# Patient Record
Sex: MALE | Race: WHITE | NOT HISPANIC OR LATINO | Employment: OTHER | URBAN - METROPOLITAN AREA
[De-identification: names, ages, dates, MRNs, and addresses within clinical notes are randomized per-mention and may not be internally consistent; named-entity substitution may affect disease eponyms.]

---

## 2021-08-19 ENCOUNTER — MEDICAL CORRESPONDENCE (OUTPATIENT)
Dept: HEALTH INFORMATION MANAGEMENT | Facility: CLINIC | Age: 71
End: 2021-08-19
Payer: MEDICARE

## 2021-12-09 DIAGNOSIS — Z11.59 ENCOUNTER FOR SCREENING FOR OTHER VIRAL DISEASES: ICD-10-CM

## 2021-12-30 NOTE — PLAN OF CARE
Your Elective TKA Patient:  Patient Name:  Delmer Miguel  : 1950  MRN: [id number]  Surgeon: Dr Long  DOS/Procedure:  2022/R TKA  Hospital: [facility]  Insurance - Medicare [facility]  PCP Viktor Chadwick  Zip code [address]     Assessment Findings: Will update with additional concerns from the H&P as indicated  Medical            Age 71            DM Type 2 - insulin dependent (1pt)            HTN- managed with 3 meds  (1pt)            CKD stage 3            Stress test schedule Mich. 3 2022            Edema of Extremities (open wound of RLL-dime size) wears compression socks            BMI= 44.21 (3pts)        Functional  Currently Ambulation - No assistive devices  Met score -No sob able to walk 4 blocks  Falls - none in last year     Living Situation  Stairs to enter home -   no steps all one level  Bathroom setup - Walk-in shower, raised toilet, no grabs bar  Sleeps in Chair - he will work on elevating his legs with pillow and ottoman (doesn t like to sleep in bed)     Post Op Support/Resources  Support - Spouse -   Transportation - Spouse     Discharge Disposition  Home with outpatient therapy at [facility] - scheduled     Please direct questions or concerns to:  Pepper Andino RN  Trauma Coordinator     [facility]  [place]  [address]   [place] [address]  T: [phone]  C. [phone]  F: [phone]  E: [email or web address]

## 2021-12-31 ENCOUNTER — LAB (OUTPATIENT)
Dept: LAB | Facility: CLINIC | Age: 71
End: 2021-12-31
Attending: ORTHOPAEDIC SURGERY
Payer: MEDICARE

## 2021-12-31 DIAGNOSIS — Z11.59 ENCOUNTER FOR SCREENING FOR OTHER VIRAL DISEASES: ICD-10-CM

## 2021-12-31 PROCEDURE — U0003 INFECTIOUS AGENT DETECTION BY NUCLEIC ACID (DNA OR RNA); SEVERE ACUTE RESPIRATORY SYNDROME CORONAVIRUS 2 (SARS-COV-2) (CORONAVIRUS DISEASE [COVID-19]), AMPLIFIED PROBE TECHNIQUE, MAKING USE OF HIGH THROUGHPUT TECHNOLOGIES AS DESCRIBED BY CMS-2020-01-R: HCPCS

## 2021-12-31 PROCEDURE — U0005 INFEC AGEN DETEC AMPLI PROBE: HCPCS

## 2021-12-31 RX ORDER — LOSARTAN POTASSIUM 100 MG/1
100 TABLET ORAL DAILY
Status: ON HOLD | COMMUNITY
End: 2022-01-05

## 2021-12-31 RX ORDER — ALLOPURINOL 300 MG/1
300 TABLET ORAL DAILY
COMMUNITY

## 2021-12-31 RX ORDER — GLIPIZIDE 2.5 MG/1
2.5 TABLET, EXTENDED RELEASE ORAL
COMMUNITY

## 2021-12-31 RX ORDER — ASPIRIN 81 MG/1
81 TABLET ORAL
Status: ON HOLD | COMMUNITY
End: 2022-01-05

## 2021-12-31 RX ORDER — NYSTATIN 100000 U/G
1 OINTMENT TOPICAL DAILY PRN
COMMUNITY

## 2021-12-31 RX ORDER — LIRAGLUTIDE 6 MG/ML
1.8 INJECTION SUBCUTANEOUS DAILY
COMMUNITY

## 2021-12-31 RX ORDER — POTASSIUM CHLORIDE 750 MG/1
10 TABLET, EXTENDED RELEASE ORAL DAILY
COMMUNITY

## 2021-12-31 RX ORDER — FUROSEMIDE 40 MG
40 TABLET ORAL 2 TIMES DAILY
COMMUNITY

## 2021-12-31 RX ORDER — CHLORAL HYDRATE 500 MG
1 CAPSULE ORAL DAILY
COMMUNITY

## 2021-12-31 RX ORDER — GLIPIZIDE 5 MG/1
5 TABLET, FILM COATED, EXTENDED RELEASE ORAL
COMMUNITY

## 2021-12-31 RX ORDER — LEVOTHYROXINE SODIUM 50 UG/1
75 TABLET ORAL DAILY
COMMUNITY

## 2021-12-31 RX ORDER — LEVOTHYROXINE SODIUM 175 UG/1
175 TABLET ORAL DAILY
COMMUNITY

## 2021-12-31 RX ORDER — LABETALOL 200 MG/1
200 TABLET, FILM COATED ORAL 3 TIMES DAILY
COMMUNITY

## 2021-12-31 RX ORDER — ATORVASTATIN CALCIUM 80 MG/1
80 TABLET, FILM COATED ORAL DAILY
COMMUNITY

## 2021-12-31 RX ORDER — AMLODIPINE BESYLATE 10 MG/1
10 TABLET ORAL DAILY
COMMUNITY

## 2021-12-31 RX ORDER — BETAMETHASONE DIPROPIONATE 0.5 MG/G
CREAM TOPICAL 2 TIMES DAILY
Status: ON HOLD | COMMUNITY
End: 2022-01-04

## 2022-01-03 LAB — EJECTION FRACTION: 62 %

## 2022-01-04 ENCOUNTER — APPOINTMENT (OUTPATIENT)
Dept: GENERAL RADIOLOGY | Facility: CLINIC | Age: 72
End: 2022-01-04
Attending: PHYSICIAN ASSISTANT
Payer: MEDICARE

## 2022-01-04 ENCOUNTER — ANESTHESIA (OUTPATIENT)
Dept: SURGERY | Facility: CLINIC | Age: 72
End: 2022-01-04
Payer: MEDICARE

## 2022-01-04 ENCOUNTER — APPOINTMENT (OUTPATIENT)
Dept: PHYSICAL THERAPY | Facility: CLINIC | Age: 72
End: 2022-01-04
Attending: ORTHOPAEDIC SURGERY
Payer: MEDICARE

## 2022-01-04 ENCOUNTER — ANESTHESIA EVENT (OUTPATIENT)
Dept: SURGERY | Facility: CLINIC | Age: 72
End: 2022-01-04
Payer: MEDICARE

## 2022-01-04 ENCOUNTER — HOSPITAL ENCOUNTER (OUTPATIENT)
Facility: CLINIC | Age: 72
Discharge: HOME | End: 2022-01-05
Attending: ORTHOPAEDIC SURGERY | Admitting: ORTHOPAEDIC SURGERY
Payer: MEDICARE

## 2022-01-04 DIAGNOSIS — Z98.890 POST-OPERATIVE STATE: Primary | ICD-10-CM

## 2022-01-04 PROBLEM — Z96.659 S/P TOTAL KNEE ARTHROPLASTY: Status: ACTIVE | Noted: 2022-01-04

## 2022-01-04 LAB
CREAT SERPL-MCNC: 1.59 MG/DL (ref 0.66–1.25)
FASTING STATUS PATIENT QL REPORTED: YES
GFR SERPL CREATININE-BSD FRML MDRD: 46 ML/MIN/1.73M2
GLUCOSE BLD-MCNC: 158 MG/DL (ref 70–99)
GLUCOSE BLDC GLUCOMTR-MCNC: 125 MG/DL (ref 70–99)
GLUCOSE BLDC GLUCOMTR-MCNC: 136 MG/DL (ref 70–99)
GLUCOSE BLDC GLUCOMTR-MCNC: 146 MG/DL (ref 70–99)
HBA1C MFR BLD: 6.6 % (ref 0–5.6)
HGB BLD-MCNC: 15 G/DL (ref 13.3–17.7)
POTASSIUM BLD-SCNC: 3.7 MMOL/L (ref 3.4–5.3)

## 2022-01-04 PROCEDURE — 99203 OFFICE O/P NEW LOW 30 MIN: CPT | Performed by: PHYSICIAN ASSISTANT

## 2022-01-04 PROCEDURE — 258N000003 HC RX IP 258 OP 636: Performed by: ANESTHESIOLOGY

## 2022-01-04 PROCEDURE — 82947 ASSAY GLUCOSE BLOOD QUANT: CPT | Mod: 91 | Performed by: ANESTHESIOLOGY

## 2022-01-04 PROCEDURE — 97110 THERAPEUTIC EXERCISES: CPT | Mod: GP

## 2022-01-04 PROCEDURE — 999N000141 HC STATISTIC PRE-PROCEDURE NURSING ASSESSMENT: Performed by: ORTHOPAEDIC SURGERY

## 2022-01-04 PROCEDURE — 250N000009 HC RX 250: Performed by: ANESTHESIOLOGY

## 2022-01-04 PROCEDURE — 250N000011 HC RX IP 250 OP 636: Performed by: NURSE ANESTHETIST, CERTIFIED REGISTERED

## 2022-01-04 PROCEDURE — 272N000001 HC OR GENERAL SUPPLY STERILE: Performed by: ORTHOPAEDIC SURGERY

## 2022-01-04 PROCEDURE — 83036 HEMOGLOBIN GLYCOSYLATED A1C: CPT | Performed by: PHYSICIAN ASSISTANT

## 2022-01-04 PROCEDURE — 250N000011 HC RX IP 250 OP 636: Performed by: PHYSICIAN ASSISTANT

## 2022-01-04 PROCEDURE — 250N000011 HC RX IP 250 OP 636: Performed by: ANESTHESIOLOGY

## 2022-01-04 PROCEDURE — 97116 GAIT TRAINING THERAPY: CPT | Mod: GP

## 2022-01-04 PROCEDURE — 250N000013 HC RX MED GY IP 250 OP 250 PS 637: Performed by: PHYSICIAN ASSISTANT

## 2022-01-04 PROCEDURE — 370N000017 HC ANESTHESIA TECHNICAL FEE, PER MIN: Performed by: ORTHOPAEDIC SURGERY

## 2022-01-04 PROCEDURE — 82565 ASSAY OF CREATININE: CPT | Performed by: ANESTHESIOLOGY

## 2022-01-04 PROCEDURE — 97161 PT EVAL LOW COMPLEX 20 MIN: CPT | Mod: GP

## 2022-01-04 PROCEDURE — 710N000009 HC RECOVERY PHASE 1, LEVEL 1, PER MIN: Performed by: ORTHOPAEDIC SURGERY

## 2022-01-04 PROCEDURE — 258N000003 HC RX IP 258 OP 636: Performed by: PHYSICIAN ASSISTANT

## 2022-01-04 PROCEDURE — 278N000051 HC OR IMPLANT GENERAL: Performed by: ORTHOPAEDIC SURGERY

## 2022-01-04 PROCEDURE — 999N000063 XR KNEE PORT RIGHT 1/2 VIEWS: Mod: RT

## 2022-01-04 PROCEDURE — C1776 JOINT DEVICE (IMPLANTABLE): HCPCS | Performed by: ORTHOPAEDIC SURGERY

## 2022-01-04 PROCEDURE — 360N000077 HC SURGERY LEVEL 4, PER MIN: Performed by: ORTHOPAEDIC SURGERY

## 2022-01-04 PROCEDURE — 36415 COLL VENOUS BLD VENIPUNCTURE: CPT | Performed by: ANESTHESIOLOGY

## 2022-01-04 PROCEDURE — 250N000009 HC RX 250: Performed by: NURSE ANESTHETIST, CERTIFIED REGISTERED

## 2022-01-04 PROCEDURE — 82962 GLUCOSE BLOOD TEST: CPT

## 2022-01-04 PROCEDURE — 84132 ASSAY OF SERUM POTASSIUM: CPT | Performed by: ANESTHESIOLOGY

## 2022-01-04 PROCEDURE — 85018 HEMOGLOBIN: CPT | Performed by: ANESTHESIOLOGY

## 2022-01-04 PROCEDURE — 250N000009 HC RX 250: Performed by: ORTHOPAEDIC SURGERY

## 2022-01-04 PROCEDURE — 258N000001 HC RX 258: Performed by: ORTHOPAEDIC SURGERY

## 2022-01-04 PROCEDURE — 250N000011 HC RX IP 250 OP 636: Performed by: ORTHOPAEDIC SURGERY

## 2022-01-04 PROCEDURE — 99207 PR CDG-CODE CATEGORY CHANGED: CPT | Performed by: PHYSICIAN ASSISTANT

## 2022-01-04 DEVICE — IMP COMP PATELLA SNN GEN II RESURFACING 41MM 71926226: Type: IMPLANTABLE DEVICE | Site: KNEE | Status: FUNCTIONAL

## 2022-01-04 DEVICE — IMP COMP FEMORAL S&N LEGION PS NP SZ6 RT 71423236: Type: IMPLANTABLE DEVICE | Site: KNEE | Status: FUNCTIONAL

## 2022-01-04 DEVICE — IMP BASEPLATE TIBIAL GENESIS II SZ 6 RT TI 71420188: Type: IMPLANTABLE DEVICE | Site: KNEE | Status: FUNCTIONAL

## 2022-01-04 DEVICE — IMP INSERT TIB S&N LGN PS HI FLEX XLPE SZ5-6 11MM 71453222: Type: IMPLANTABLE DEVICE | Site: KNEE | Status: FUNCTIONAL

## 2022-01-04 DEVICE — BONE CEMENT SIMPLEX FULL DOSE 6191-1-001: Type: IMPLANTABLE DEVICE | Site: KNEE | Status: FUNCTIONAL

## 2022-01-04 RX ORDER — VANCOMYCIN HYDROCHLORIDE 1 G/20ML
INJECTION, POWDER, LYOPHILIZED, FOR SOLUTION INTRAVENOUS PRN
Status: DISCONTINUED | OUTPATIENT
Start: 2022-01-04 | End: 2022-01-04 | Stop reason: HOSPADM

## 2022-01-04 RX ORDER — BISACODYL 10 MG
10 SUPPOSITORY, RECTAL RECTAL DAILY PRN
Status: DISCONTINUED | OUTPATIENT
Start: 2022-01-04 | End: 2022-01-05 | Stop reason: HOSPADM

## 2022-01-04 RX ORDER — AMOXICILLIN 250 MG
1 CAPSULE ORAL 2 TIMES DAILY
Status: DISCONTINUED | OUTPATIENT
Start: 2022-01-04 | End: 2022-01-05 | Stop reason: HOSPADM

## 2022-01-04 RX ORDER — POLYETHYLENE GLYCOL 3350 17 G/17G
17 POWDER, FOR SOLUTION ORAL DAILY
Status: DISCONTINUED | OUTPATIENT
Start: 2022-01-05 | End: 2022-01-05 | Stop reason: HOSPADM

## 2022-01-04 RX ORDER — LOSARTAN POTASSIUM 100 MG/1
100 TABLET ORAL DAILY
Status: DISCONTINUED | OUTPATIENT
Start: 2022-01-05 | End: 2022-01-05 | Stop reason: HOSPADM

## 2022-01-04 RX ORDER — SODIUM CHLORIDE, SODIUM LACTATE, POTASSIUM CHLORIDE, CALCIUM CHLORIDE 600; 310; 30; 20 MG/100ML; MG/100ML; MG/100ML; MG/100ML
INJECTION, SOLUTION INTRAVENOUS CONTINUOUS
Status: DISCONTINUED | OUTPATIENT
Start: 2022-01-04 | End: 2022-01-04 | Stop reason: HOSPADM

## 2022-01-04 RX ORDER — ACETAMINOPHEN 325 MG/1
975 TABLET ORAL ONCE
Status: COMPLETED | OUTPATIENT
Start: 2022-01-04 | End: 2022-01-04

## 2022-01-04 RX ORDER — SODIUM CHLORIDE, SODIUM LACTATE, POTASSIUM CHLORIDE, CALCIUM CHLORIDE 600; 310; 30; 20 MG/100ML; MG/100ML; MG/100ML; MG/100ML
INJECTION, SOLUTION INTRAVENOUS CONTINUOUS
Status: DISCONTINUED | OUTPATIENT
Start: 2022-01-04 | End: 2022-01-05 | Stop reason: HOSPADM

## 2022-01-04 RX ORDER — HYDROMORPHONE HCL IN WATER/PF 6 MG/30 ML
0.2 PATIENT CONTROLLED ANALGESIA SYRINGE INTRAVENOUS EVERY 5 MIN PRN
Status: DISCONTINUED | OUTPATIENT
Start: 2022-01-04 | End: 2022-01-04 | Stop reason: HOSPADM

## 2022-01-04 RX ORDER — CELECOXIB 100 MG/1
100 CAPSULE ORAL 2 TIMES DAILY
Status: DISCONTINUED | OUTPATIENT
Start: 2022-01-04 | End: 2022-01-05 | Stop reason: HOSPADM

## 2022-01-04 RX ORDER — ASPIRIN 81 MG/1
162 TABLET ORAL DAILY
Status: DISCONTINUED | OUTPATIENT
Start: 2022-01-04 | End: 2022-01-05 | Stop reason: HOSPADM

## 2022-01-04 RX ORDER — CEFAZOLIN SODIUM IN 0.9 % NACL 3 G/100 ML
3 INTRAVENOUS SOLUTION, PIGGYBACK (ML) INTRAVENOUS EVERY 8 HOURS
Status: COMPLETED | OUTPATIENT
Start: 2022-01-04 | End: 2022-01-05

## 2022-01-04 RX ORDER — LEVOTHYROXINE SODIUM 75 UG/1
75 TABLET ORAL DAILY
Status: DISCONTINUED | OUTPATIENT
Start: 2022-01-05 | End: 2022-01-04

## 2022-01-04 RX ORDER — ACETAMINOPHEN 325 MG/1
975 TABLET ORAL EVERY 8 HOURS
Status: DISCONTINUED | OUTPATIENT
Start: 2022-01-04 | End: 2022-01-05 | Stop reason: HOSPADM

## 2022-01-04 RX ORDER — CHLOROPROCAINE HYDROCHLORIDE 20 MG/ML
INJECTION, SOLUTION EPIDURAL; INFILTRATION; INTRACAUDAL; PERINEURAL
Status: DISCONTINUED | OUTPATIENT
Start: 2022-01-04 | End: 2022-01-04

## 2022-01-04 RX ORDER — DEXTROSE MONOHYDRATE 25 G/50ML
25-50 INJECTION, SOLUTION INTRAVENOUS
Status: DISCONTINUED | OUTPATIENT
Start: 2022-01-04 | End: 2022-01-05 | Stop reason: HOSPADM

## 2022-01-04 RX ORDER — DIPHENHYDRAMINE HCL 12.5MG/5ML
12.5 LIQUID (ML) ORAL EVERY 6 HOURS PRN
Status: DISCONTINUED | OUTPATIENT
Start: 2022-01-04 | End: 2022-01-05 | Stop reason: HOSPADM

## 2022-01-04 RX ORDER — FENTANYL CITRATE 0.05 MG/ML
25 INJECTION, SOLUTION INTRAMUSCULAR; INTRAVENOUS EVERY 5 MIN PRN
Status: DISCONTINUED | OUTPATIENT
Start: 2022-01-04 | End: 2022-01-04 | Stop reason: HOSPADM

## 2022-01-04 RX ORDER — CELECOXIB 200 MG/1
400 CAPSULE ORAL ONCE
Status: COMPLETED | OUTPATIENT
Start: 2022-01-04 | End: 2022-01-04

## 2022-01-04 RX ORDER — LIDOCAINE HYDROCHLORIDE 20 MG/ML
INJECTION, SOLUTION INFILTRATION; PERINEURAL PRN
Status: DISCONTINUED | OUTPATIENT
Start: 2022-01-04 | End: 2022-01-04

## 2022-01-04 RX ORDER — LIDOCAINE 40 MG/G
CREAM TOPICAL
Status: DISCONTINUED | OUTPATIENT
Start: 2022-01-04 | End: 2022-01-04 | Stop reason: HOSPADM

## 2022-01-04 RX ORDER — ONDANSETRON 2 MG/ML
4 INJECTION INTRAMUSCULAR; INTRAVENOUS EVERY 30 MIN PRN
Status: DISCONTINUED | OUTPATIENT
Start: 2022-01-04 | End: 2022-01-04 | Stop reason: HOSPADM

## 2022-01-04 RX ORDER — ALLOPURINOL 300 MG/1
300 TABLET ORAL DAILY
Status: DISCONTINUED | OUTPATIENT
Start: 2022-01-05 | End: 2022-01-05 | Stop reason: HOSPADM

## 2022-01-04 RX ORDER — ONDANSETRON 4 MG/1
4 TABLET, ORALLY DISINTEGRATING ORAL EVERY 30 MIN PRN
Status: DISCONTINUED | OUTPATIENT
Start: 2022-01-04 | End: 2022-01-04 | Stop reason: HOSPADM

## 2022-01-04 RX ORDER — OXYCODONE HYDROCHLORIDE 5 MG/1
5 TABLET ORAL EVERY 4 HOURS PRN
Status: DISCONTINUED | OUTPATIENT
Start: 2022-01-04 | End: 2022-01-05 | Stop reason: HOSPADM

## 2022-01-04 RX ORDER — MAGNESIUM HYDROXIDE 1200 MG/15ML
LIQUID ORAL PRN
Status: DISCONTINUED | OUTPATIENT
Start: 2022-01-04 | End: 2022-01-04 | Stop reason: HOSPADM

## 2022-01-04 RX ORDER — OXYCODONE HYDROCHLORIDE 5 MG/1
5 TABLET ORAL EVERY 4 HOURS PRN
Status: DISCONTINUED | OUTPATIENT
Start: 2022-01-04 | End: 2022-01-04 | Stop reason: HOSPADM

## 2022-01-04 RX ORDER — LEVOTHYROXINE SODIUM 75 UG/1
75 TABLET ORAL
Status: DISCONTINUED | OUTPATIENT
Start: 2022-01-05 | End: 2022-01-05 | Stop reason: HOSPADM

## 2022-01-04 RX ORDER — POTASSIUM CHLORIDE 750 MG/1
10 TABLET, EXTENDED RELEASE ORAL EVERY EVENING
Status: DISCONTINUED | OUTPATIENT
Start: 2022-01-04 | End: 2022-01-04

## 2022-01-04 RX ORDER — AMLODIPINE BESYLATE 10 MG/1
10 TABLET ORAL DAILY
Status: DISCONTINUED | OUTPATIENT
Start: 2022-01-05 | End: 2022-01-05 | Stop reason: HOSPADM

## 2022-01-04 RX ORDER — HYDROMORPHONE HCL IN WATER/PF 6 MG/30 ML
0.2 PATIENT CONTROLLED ANALGESIA SYRINGE INTRAVENOUS
Status: DISCONTINUED | OUTPATIENT
Start: 2022-01-04 | End: 2022-01-05 | Stop reason: HOSPADM

## 2022-01-04 RX ORDER — FUROSEMIDE 40 MG
40 TABLET ORAL 2 TIMES DAILY
Status: DISCONTINUED | OUTPATIENT
Start: 2022-01-05 | End: 2022-01-05 | Stop reason: HOSPADM

## 2022-01-04 RX ORDER — PROPOFOL 10 MG/ML
INJECTION, EMULSION INTRAVENOUS PRN
Status: DISCONTINUED | OUTPATIENT
Start: 2022-01-04 | End: 2022-01-04

## 2022-01-04 RX ORDER — LIDOCAINE 40 MG/G
CREAM TOPICAL
Status: DISCONTINUED | OUTPATIENT
Start: 2022-01-04 | End: 2022-01-05 | Stop reason: HOSPADM

## 2022-01-04 RX ORDER — HYDROMORPHONE HCL IN WATER/PF 6 MG/30 ML
0.4 PATIENT CONTROLLED ANALGESIA SYRINGE INTRAVENOUS
Status: DISCONTINUED | OUTPATIENT
Start: 2022-01-04 | End: 2022-01-05 | Stop reason: HOSPADM

## 2022-01-04 RX ORDER — NYSTATIN 100000 U/G
1 OINTMENT TOPICAL DAILY PRN
Status: DISCONTINUED | OUTPATIENT
Start: 2022-01-04 | End: 2022-01-05 | Stop reason: HOSPADM

## 2022-01-04 RX ORDER — HYDROXYZINE HYDROCHLORIDE 10 MG/1
10 TABLET, FILM COATED ORAL EVERY 6 HOURS PRN
Status: DISCONTINUED | OUTPATIENT
Start: 2022-01-04 | End: 2022-01-05 | Stop reason: HOSPADM

## 2022-01-04 RX ORDER — NALOXONE HYDROCHLORIDE 0.4 MG/ML
0.4 INJECTION, SOLUTION INTRAMUSCULAR; INTRAVENOUS; SUBCUTANEOUS
Status: DISCONTINUED | OUTPATIENT
Start: 2022-01-04 | End: 2022-01-05 | Stop reason: HOSPADM

## 2022-01-04 RX ORDER — TRANEXAMIC ACID 650 MG/1
1950 TABLET ORAL ONCE
Status: COMPLETED | OUTPATIENT
Start: 2022-01-04 | End: 2022-01-04

## 2022-01-04 RX ORDER — NALOXONE HYDROCHLORIDE 0.4 MG/ML
0.2 INJECTION, SOLUTION INTRAMUSCULAR; INTRAVENOUS; SUBCUTANEOUS
Status: DISCONTINUED | OUTPATIENT
Start: 2022-01-04 | End: 2022-01-05 | Stop reason: HOSPADM

## 2022-01-04 RX ORDER — PROPOFOL 10 MG/ML
INJECTION, EMULSION INTRAVENOUS CONTINUOUS PRN
Status: DISCONTINUED | OUTPATIENT
Start: 2022-01-04 | End: 2022-01-04

## 2022-01-04 RX ORDER — LABETALOL 200 MG/1
200 TABLET, FILM COATED ORAL 3 TIMES DAILY
Status: DISCONTINUED | OUTPATIENT
Start: 2022-01-04 | End: 2022-01-05 | Stop reason: HOSPADM

## 2022-01-04 RX ORDER — ONDANSETRON 4 MG/1
4 TABLET, ORALLY DISINTEGRATING ORAL EVERY 6 HOURS PRN
Status: DISCONTINUED | OUTPATIENT
Start: 2022-01-04 | End: 2022-01-05 | Stop reason: HOSPADM

## 2022-01-04 RX ORDER — HYDRALAZINE HYDROCHLORIDE 20 MG/ML
10 INJECTION INTRAMUSCULAR; INTRAVENOUS EVERY 4 HOURS PRN
Status: DISCONTINUED | OUTPATIENT
Start: 2022-01-04 | End: 2022-01-05 | Stop reason: HOSPADM

## 2022-01-04 RX ORDER — ONDANSETRON 2 MG/ML
4 INJECTION INTRAMUSCULAR; INTRAVENOUS EVERY 6 HOURS PRN
Status: DISCONTINUED | OUTPATIENT
Start: 2022-01-04 | End: 2022-01-05 | Stop reason: HOSPADM

## 2022-01-04 RX ORDER — ONDANSETRON 2 MG/ML
INJECTION INTRAMUSCULAR; INTRAVENOUS PRN
Status: DISCONTINUED | OUTPATIENT
Start: 2022-01-04 | End: 2022-01-04

## 2022-01-04 RX ORDER — CELECOXIB 100 MG/1
100 CAPSULE ORAL ONCE
Status: COMPLETED | OUTPATIENT
Start: 2022-01-04 | End: 2022-01-04

## 2022-01-04 RX ORDER — NICOTINE POLACRILEX 4 MG
15-30 LOZENGE BUCCAL
Status: DISCONTINUED | OUTPATIENT
Start: 2022-01-04 | End: 2022-01-05 | Stop reason: HOSPADM

## 2022-01-04 RX ORDER — CEFAZOLIN SODIUM IN 0.9 % NACL 3 G/100 ML
3 INTRAVENOUS SOLUTION, PIGGYBACK (ML) INTRAVENOUS SEE ADMIN INSTRUCTIONS
Status: DISCONTINUED | OUTPATIENT
Start: 2022-01-04 | End: 2022-01-04 | Stop reason: HOSPADM

## 2022-01-04 RX ORDER — ATORVASTATIN CALCIUM 40 MG/1
80 TABLET, FILM COATED ORAL DAILY
Status: DISCONTINUED | OUTPATIENT
Start: 2022-01-05 | End: 2022-01-05 | Stop reason: HOSPADM

## 2022-01-04 RX ORDER — OXYCODONE HYDROCHLORIDE 5 MG/1
10 TABLET ORAL EVERY 4 HOURS PRN
Status: DISCONTINUED | OUTPATIENT
Start: 2022-01-04 | End: 2022-01-05 | Stop reason: HOSPADM

## 2022-01-04 RX ORDER — PROCHLORPERAZINE MALEATE 5 MG
5 TABLET ORAL EVERY 6 HOURS PRN
Status: DISCONTINUED | OUTPATIENT
Start: 2022-01-04 | End: 2022-01-05 | Stop reason: HOSPADM

## 2022-01-04 RX ORDER — CEFAZOLIN SODIUM IN 0.9 % NACL 3 G/100 ML
3 INTRAVENOUS SOLUTION, PIGGYBACK (ML) INTRAVENOUS
Status: DISCONTINUED | OUTPATIENT
Start: 2022-01-04 | End: 2022-01-04 | Stop reason: HOSPADM

## 2022-01-04 RX ORDER — POTASSIUM CHLORIDE 750 MG/1
10 TABLET, EXTENDED RELEASE ORAL EVERY EVENING
Status: DISCONTINUED | OUTPATIENT
Start: 2022-01-05 | End: 2022-01-05 | Stop reason: HOSPADM

## 2022-01-04 RX ADMIN — SODIUM CHLORIDE, POTASSIUM CHLORIDE, SODIUM LACTATE AND CALCIUM CHLORIDE: 600; 310; 30; 20 INJECTION, SOLUTION INTRAVENOUS at 10:16

## 2022-01-04 RX ADMIN — HYDROXYZINE HYDROCHLORIDE 10 MG: 10 TABLET ORAL at 16:21

## 2022-01-04 RX ADMIN — LIDOCAINE HYDROCHLORIDE 1 ML: 10 INJECTION, SOLUTION EPIDURAL; INFILTRATION; INTRACAUDAL; PERINEURAL at 10:16

## 2022-01-04 RX ADMIN — HYDROMORPHONE HYDROCHLORIDE 0.2 MG: 0.2 INJECTION, SOLUTION INTRAMUSCULAR; INTRAVENOUS; SUBCUTANEOUS at 14:03

## 2022-01-04 RX ADMIN — Medication 3 G: at 21:00

## 2022-01-04 RX ADMIN — OXYCODONE HYDROCHLORIDE 5 MG: 5 TABLET ORAL at 16:20

## 2022-01-04 RX ADMIN — LABETALOL HCL 200 MG: 200 TABLET, FILM COATED ORAL at 17:32

## 2022-01-04 RX ADMIN — TRANEXAMIC ACID 1950 MG: 650 TABLET ORAL at 09:12

## 2022-01-04 RX ADMIN — CELECOXIB 400 MG: 200 CAPSULE ORAL at 09:14

## 2022-01-04 RX ADMIN — CHLOROPROCAINE HYDROCHLORIDE 60 MG: 20 INJECTION, SOLUTION EPIDURAL; INFILTRATION; INTRACAUDAL; PERINEURAL at 11:15

## 2022-01-04 RX ADMIN — CELECOXIB 100 MG: 100 CAPSULE ORAL at 20:59

## 2022-01-04 RX ADMIN — MIDAZOLAM 2 MG: 1 INJECTION INTRAMUSCULAR; INTRAVENOUS at 11:16

## 2022-01-04 RX ADMIN — ONDANSETRON 4 MG: 2 INJECTION INTRAMUSCULAR; INTRAVENOUS at 11:38

## 2022-01-04 RX ADMIN — ASPIRIN 162 MG: 81 TABLET, COATED ORAL at 16:21

## 2022-01-04 RX ADMIN — SENNOSIDES AND DOCUSATE SODIUM 1 TABLET: 50; 8.6 TABLET ORAL at 20:59

## 2022-01-04 RX ADMIN — BUPIVACAINE HYDROCHLORIDE 15 ML: 5 INJECTION, SOLUTION EPIDURAL; INTRACAUDAL at 11:10

## 2022-01-04 RX ADMIN — PROPOFOL 150 MCG/KG/MIN: 10 INJECTION, EMULSION INTRAVENOUS at 11:31

## 2022-01-04 RX ADMIN — ACETAMINOPHEN 975 MG: 325 TABLET, FILM COATED ORAL at 09:13

## 2022-01-04 RX ADMIN — LABETALOL HCL 200 MG: 200 TABLET, FILM COATED ORAL at 21:35

## 2022-01-04 RX ADMIN — PROPOFOL 30 MG: 10 INJECTION, EMULSION INTRAVENOUS at 11:33

## 2022-01-04 RX ADMIN — Medication 2 G: at 11:26

## 2022-01-04 RX ADMIN — ACETAMINOPHEN 975 MG: 325 TABLET, FILM COATED ORAL at 17:30

## 2022-01-04 RX ADMIN — SODIUM CHLORIDE, POTASSIUM CHLORIDE, SODIUM LACTATE AND CALCIUM CHLORIDE: 600; 310; 30; 20 INJECTION, SOLUTION INTRAVENOUS at 12:20

## 2022-01-04 RX ADMIN — SODIUM CHLORIDE, POTASSIUM CHLORIDE, SODIUM LACTATE AND CALCIUM CHLORIDE: 600; 310; 30; 20 INJECTION, SOLUTION INTRAVENOUS at 15:00

## 2022-01-04 RX ADMIN — LIDOCAINE HYDROCHLORIDE 60 MG: 20 INJECTION, SOLUTION INFILTRATION; PERINEURAL at 11:32

## 2022-01-04 ASSESSMENT — ENCOUNTER SYMPTOMS
SEIZURES: 0
DYSRHYTHMIAS: 0

## 2022-01-04 ASSESSMENT — LIFESTYLE VARIABLES: TOBACCO_USE: 0

## 2022-01-04 ASSESSMENT — MIFFLIN-ST. JEOR: SCORE: 2229.1

## 2022-01-04 NOTE — ANESTHESIA PROCEDURE NOTES
Intrathecal Procedure Note    Pre-Procedure   Staff -        Anesthesiologist:  Lenka Glass MD       Performed By: anesthesiologist       Location: OR       Pre-Anesthestic Checklist: patient identified, IV checked, site marked, risks and benefits discussed, informed consent, monitors and equipment checked, pre-op evaluation and at physician/surgeon's request  Timeout:       Correct Patient: Yes        Correct Procedure: Yes        Correct Site: Yes        Correct Position: Yes   Procedure Documentation  Procedure: intrathecal       Patient Position: sitting       Skin prep: Betadine       Insertion Site: L2-3. (midline approach).       Spinal Needle Type: Cristina tip       Introducer used       Introducer: 20 G      # of attempts: 1 and  # of redirects:     Assessment/Narrative         Paresthesias: No.       CSF fluid: clear.      Opening pressure was cmH2O while  Sitting.      Medication(s) Administered   2% Chloroprocaine PF (Intrathecal), 60 mg  Medication Administration Time: 1/4/2022 11:15 AM     Comments:  Patient sitting on edge of OR bed, lower back cleaned and prepped in sterile fashion with betadine. 1% lido used to numb area. Introducer placed, spinal needle through introducer. Appropriate flow of CSF and confirmed with aspiration via syringe. Spinal dose given, 60 mg 2% chloroprocaine. No complications.

## 2022-01-04 NOTE — CONSULTS
[facility]  Consult Note - Hospitalist Service     Date of Admission:  1/4/2022  Consult Requested by: Orthopedics  Reason for Consult: Medical comanagement    Assessment & Plan   Delmer Miguel is a 71 year old male with PMH significant for non-insulin-dependent diabetes, hypertension, lipidemia edema, CKD stage III, hypothyroidism and obesity, ascending aorta dilation who was admitted to [facility] on 1/4/2022 by the orthopedic service for right knee arthroplasty with Dr. Long.    Right knee arthroplasty 1/4/22  - Routine postoperative cares per primary service, including IVF, pain control, abx, DVT ppx, and disposition  - PT/OT as per primary service  - Aggressive pulmonary toilet, frequent IS use 10x/hour while awake    Chronic kidney disease, stage 3b  Baseline Cr 1.6. On admission, at baseline.  Follows with nephrology as an outpatient.  - Given 400mg celebrex this AM, to start 100mg BID, will allow 1 more dose tonight and check BMP in AM, hold remaining doses until follow up on BMP. Avoid all other NSAIDs and discontinue celebrex if Cr elevates above baseline  - Avoid nephrotoxins - contrast, NSAIDs  - Renally dose medications as able/needed  - Holding ARB until checking renal function on postop day #1  - Monitor    Benign essential hypertension  Chronic lower extremity edema  History of first-degree AV block, intraventricular block  PTA regimen: Amlodipine 10 mg daily, furosemide 40 mg twice daily, labetalol 200 mg 3 times daily, losartan 100 mg daily  - Continue PTA antihypertensives with hold parameters  - Resume PTA potassium with furosemide resume on postop day #1  - Discontinue IV fluids when taking p.o.   - Hold PTA losartan until basic metabolic panel results in a.m. to check on renal function*  - PRN IV hydralazine available for SBP >180  - Monitor BP trend and need for medication adjustments    Non-Insulin dependent type 2 diabetes  PTA Regimen:  Glipizide and Victoza  Last HbA1c 6.9%  - Hypoglycemia protocol  - Preprandial and HS fingerstick checks or Q 4 hours while NPO  - Hold PTA oral antiglycemics, resume on discharge  - Sliding scale insulin Medium   - Long acting regimen: None  - Consistent CHO diet 75gm per meal    Recent Labs   Lab 01/04/22  1357 01/04/22  0910   * 158*       SARAH: Noncompliant with CPAP.  May use nasal cannula as needed    Other pertinent history and chronic stable diagnoses: Appropriate PTA medications will be resumed.  Morbid obesity  Hypothyroidism  Gout  Ascending aorta dilatation  Normocytic anemia      Diet: High Consistent Carb (75 g CHO per Meal) Diet    DVT Prophylaxis: Defer to primary service  Malone Catheter: Not present  Code Status: Full Code      The patient's care was discussed with the Attending Physician, Dr. Yin and Patient.    SANJUANITA Anguiano, PA-C  Hospitalist MAKENZIE  [facility]  Securely message with the Vocera Web Console (learn more here)  Text page via [employer] Paging/Directory  ______________________________________________________________________    Chief Complaint   Knee pain    History is obtained from the patient and electronic health record    History of Present Illness   Delmer Miguel is a 71 year old male with PMH significant for non-insulin-dependent diabetes, hypertension, lipidemia edema, CKD stage III, hypothyroidism and obesity, ascending aorta dilation who was admitted to [facility] on 1/4/2022 by the orthopedic service for right knee arthroplasty with Dr. Long. No immediate postoperative complications, spinal block with abductor canal block., EBL 25mL. The Hospitalist service was consulted for medical co-management. Preoperative H&P reviewed.    During my visit, patient is feeling well, pain controlled.  Denies chest pain, lightheadedness, dizziness, palpitations.     Review of Systems   The 10 point Review of Systems is  negative other than noted in the HPI or here.     Past Medical History    I have reviewed this patient's medical history and updated it with pertinent information if needed.   Non-insulin-dependent type 2 diabetes  Hyperlipidemia  Morbid obesity  Hypothyroidism  Hypertension  Erectile dysfunction  Gout    Past Surgical History   I have reviewed this patient's surgical history and updated it with pertinent information if needed.  Ear surgery  Achilles tendon repair    Social History   I have reviewed this patient's social history and updated it with pertinent information if needed.  Social History     Tobacco Use     Smoking status: Former Smoker     Packs/day: 1.00     Years: 2.00     Pack years: 2.00     Types: Cigarettes, Cigars     Smokeless tobacco: Never Used     Tobacco comment: hasn't smoked anything in 25 years   Substance Use Topics     Alcohol use: None     Comment: 1-2 beer every four months     Drug use: None       Family History   I have reviewed this patient's family history and updated it with pertinent information if needed.   Father-heart disease and myocardial infarction  Mother-emphysema    Medications   I have reviewed this patient's current medications  Medications Prior to Admission   Medication Sig Dispense Refill Last Dose     allopurinol (ZYLOPRIM) 300 MG tablet Take 300 mg by mouth daily   1/4/2022 at am     amLODIPine (NORVASC) 10 MG tablet Take 10 mg by mouth daily   1/4/2022 at am     aspirin 81 MG EC tablet Take 81 mg by mouth daily (with dinner)   12/31/2021 at pm     atorvastatin (LIPITOR) 80 MG tablet Take 80 mg by mouth daily   1/4/2022 at am     fish oil-omega-3 fatty acids 1000 MG capsule Take 1 g by mouth daily   1/3/2022 at am     furosemide (LASIX) 40 MG tablet Take 40 mg by mouth 2 times daily   1/3/2022 at pm     glipiZIDE (GLUCOTROL XL) 2.5 MG 24 hr tablet Take 2.5 mg by mouth daily (with breakfast) Along with 5 mg dose (2.5 mg + 5 mg = 7.5 mg)   1/3/2022 at am     glipiZIDE  (GLUCOTROL XL) 5 MG 24 hr tablet Take 5 mg by mouth daily (with breakfast) Along with the 2.5 mg dose (5 mg + 2.5 = 7.5 mg)   1/3/2022 at am     labetalol (NORMODYNE) 200 MG tablet Take 200 mg by mouth 3 times daily   1/4/2022 at 0700     levothyroxine (SYNTHROID/LEVOTHROID) 175 MCG tablet Take 175 mcg by mouth daily Along with 50 mcg dose (175 mcg + 75 mcg = 250 mcg)   1/4/2022 at am     levothyroxine (SYNTHROID/LEVOTHROID) 50 MCG tablet Take 75 mcg by mouth daily (1.5 x 50 mg) Along with 175 mcg dose (75 mcg + 175 mcg = 250 mcg)   1/4/2022 at am     liraglutide (VICTOZA) 18 MG/3ML solution Inject 1.8 mg Subcutaneous daily   1/3/2022 at am     losartan (COZAAR) 100 MG tablet Take 100 mg by mouth daily   1/4/2022 at am     nystatin (MYCOSTATIN) 982836 UNIT/GM external ointment Apply 1 g topically daily as needed     at prn     potassium chloride ER (KLOR-CON M) 10 MEQ CR tablet Take 10 mEq by mouth daily   1/3/2022 at pm       Allergies   No Known Allergies    Physical Exam   Vital Signs: Temp: 97.2  F (36.2  C) Temp src: Oral BP: (!) 156/78 Pulse: 60   Resp: 14 SpO2: 91 % O2 Device: None (Room air) Oxygen Delivery: 1 LPM  Weight: 315 lbs 8 oz    Physical Exam    General: Awake, alert, very pleasant man who appears stated age. Looks comfortable sitting up in bed. No acute distress.  HEENT: Normocephalic, atraumatic. Extraocular movements intact.  Respiratory: Clear to auscultation bilaterally, no rales, wheezing, or rhonchi.  Cardiovascular: Regular rate and rhythm, +S1 and S2, no murmur auscultated. 1+ peripheral edema.   Gastrointestinal: Soft, non-tender, non-distended. Bowel sounds present.  Skin: Warm, dry. No obvious rashes or lesions on exposed skin. Dorsalis pedis pulses palpable bilaterally.   Musculoskeletal: No joint swelling, erythema or tenderness. Moves all extremities equally. RLE ACE wrapped. Motor and sensation intact.  Neurologic: AAO x3. Cranial nerves 2-12 grossly intact, normal strength and  sensation.  Psychiatric: Appropriate mood and affect. No obvious anxiety or depression.    Data   Results for orders placed or performed during the hospital encounter of 01/04/22 (from the past 24 hour(s))   Hemoglobin   Result Value Ref Range    Hemoglobin 15.0 13.3 - 17.7 g/dL   Creatinine   Result Value Ref Range    Creatinine 1.59 (H) 0.66 - 1.25 mg/dL    GFR Estimate 46 (L) >60 mL/min/1.73m2   Potassium   Result Value Ref Range    Potassium 3.7 3.4 - 5.3 mmol/L   Glucose   Result Value Ref Range    Glucose 158 (H) 70 - 99 mg/dL    Patient Fasting > 8hrs? Yes    Hemoglobin A1c   Result Value Ref Range    Hemoglobin A1C 6.6 (H) 0.0 - 5.6 %   Glucose by meter   Result Value Ref Range    GLUCOSE BY METER POCT 136 (H) 70 - 99 mg/dL   XR Knee Port Right 1/2 Views    Narrative    KNEE ONE-TWO VIEWS RIGHT  1/4/2022 2:09 PM     HISTORY: Post-Op Total Knee    COMPARISON: None.      Impression    IMPRESSION: Status post recent right total knee arthroplasty. No  immediate hardware complication. Expected postsurgical soft tissue  edema, subcutaneous emphysema, and gas-containing joint effusion. No  acute fracture or malalignment. Atherosclerosis.    GRACIELA MURILLO MD         SYSTEM ID:  [id number]

## 2022-01-04 NOTE — ANESTHESIA POSTPROCEDURE EVALUATION
Patient: Delmer Miguel    Procedure: Procedure(s):  RIGHT TOTAL KNEE ARTHROPLASTY       Diagnosis:Osteoarthritis of right knee, unspecified osteoarthritis type [M17.11]  Diagnosis Additional Information: No value filed.    Anesthesia Type:  Spinal    Note:  Disposition: Admission   Postop Pain Control: Uneventful            Sign Out: Well controlled pain   PONV: No   Neuro/Psych: Uneventful            Sign Out: Acceptable/Baseline neuro status   Airway/Respiratory: Uneventful            Sign Out: Acceptable/Baseline resp. status   CV/Hemodynamics: Uneventful            Sign Out: Acceptable CV status; No obvious hypovolemia; No obvious fluid overload   Other NRE: NONE   DID A NON-ROUTINE EVENT OCCUR? No           Last vitals:  Vitals Value Taken Time   /64 01/04/22 1410   Temp 36.4  C (97.6  F) 01/04/22 1410   Pulse 59 01/04/22 1416   Resp 21 01/04/22 1416   SpO2 97 % 01/04/22 1418   Vitals shown include unvalidated device data.    Electronically Signed By: Lenka Glass MD  January 4, 2022  3:32 PM

## 2022-01-04 NOTE — ANESTHESIA PROCEDURE NOTES
Adductor canal (targeting saphenous nerve) Procedure Note    Pre-Procedure   Staff -        Anesthesiologist:  Lenka Glass MD       Performed By: anesthesiologist       Location: pre-op       Pre-Anesthestic Checklist: patient identified, IV checked, site marked, risks and benefits discussed, informed consent, monitors and equipment checked, pre-op evaluation, at physician/surgeon's request and post-op pain management  Timeout:       Correct Patient: Yes        Correct Procedure: Yes        Correct Site: Yes        Correct Position: Yes        Correct Laterality: Yes        Site Marked: Yes  Procedure Documentation  Procedure: Adductor canal (targeting saphenous nerve)       Laterality: right       Patient Position: supine       Skin prep: Chloraprep       Local skin infiltrated with 1 mL of 1% lidocaine.        Needle Type: insulated       Needle Gauge: 21.        Needle Length (millimeters): 100        Ultrasound guided       1. Ultrasound was used to identify targeted nerve, plexus, vascular marker, or fascial plane and place a needle adjacent to it in real-time.       2. Ultrasound was used to visualize the spread of anesthetic in close proximity to the above referenced structure.       3. A permanent image is entered into the patient's record.       4. The visualized anatomic structures appeared normal.       5. There were no apparent abnormal pathologic findings.    Assessment/Narrative         The placement was negative for: blood aspirated, painful injection and site bleeding       Paresthesias: No.     Bolus given via needle..        Secured via.        Insertion/Infusion Method: Single Shot       Complications: none    Medication(s) Administered   Bupivacaine 0.5% w/ 1:400K Epi (Injection), 15 mL  Medication Administration Time: 1/4/2022 11:10 AM     Comments:  Bolus via needle, 15 ml of 0.5% bupivacaine with 1:400,000 epinephrine.  Patient tolerated well, was mildly sedated but communicative  throughout the procedure.   The surgeon has given a verbal order transferring care of this patient to me for the performance of regional analgesia block for post op pain control. It is requested of me because I am uniquely trained and qualified to perform this block and the surgeon is neither trained nor qualified to perform this procedure.

## 2022-01-04 NOTE — PROCEDURES
DATE OF SERVICE: 1/4/2022    SURGEON   JAKE HULL MD     FIRST ASSISTANT   ESTHER PRIEST PA-C   Please bill for Mr. Priest as first assistant as there was no resident available to assist in this case. Assistants were necessary and performed positioning, draping, retraction, suturing and wound dressing tasks throughout the surgical procedure. The assistant was present for the entire procedure.    ASSISTANTS  KRYSTIN Martinez     PREOPERATIVE DIAGNOSIS   Endstage right knee tricompartmental osteoarthritis.     POSTOPERATIVE DIAGNOSIS   Endstage right knee tricompartmental osteoarthritis.     PROCEDURE   Right  total knee arthroplasty using [employer] components, a size 6 Legion posterior stabilized femur, a size 6 tibia, a size 41 mm round patella, and a size 11 posterior stabilized polyethylene insert.     INDICATIONS FOR SURGERY   Delmer Miguel [id number] is a 71 year old-year-old male with a long history of right knee pain. The patient had failed all of the conservative and reasonable options. After discussing with the patient, it was decided that they would benefit from the above-mentioned procedure. Informed consent was obtained.     ANESTHESIA   Spinal block with an abductor canal block.     FINDINGS   There was tricompartmental osteoarthritis. Ligaments were intact otherwise.     DESCRIPTION OF PROCEDURE   Delmer Miguel was correctly identified by myself in the PAR and their right lower extremity was marked. A single-shot adductor canal block was placed. The patient was then taken to the operating room where a spinal anesthetic was placed. The patient was placed supine. A tourniquet was placed around the right proximal thigh. A bump was placed underneath the right hip. The patient was then prepped with Avagard, followed by a 10 minute Betadine scrub, alcohol paint, DuraPrep and then draped in the usual fashion. A timeout was performed. The tourniquet was then inflated to 300 mmHg. The incision  was made just medial to the patella for the mini sub-vastus approach after injection with the anesthetic solution, and carried down with sharp dissection. The vastus medialis was then released from its bed and swept laterally. The fat pad excision was performed at this time, as well as a medial release. The knee was then brought into flexion. The anterior horns of the medial and lateral menisci were excised, as well as the ACL and PCL. A PCL retractor was placed. The external tibial cutting guide was placed at 0 degrees slope. This was pinned with a planned 2 mm cut off the medial side and the cut was made with an oscillating saw. This was sized to a(n) 6, pinned in place, and we had excellent alignment with an alignment casper. The tibia was reamed and punched in the usual fashion and the trial tibial component was removed. Attention was next turned to the femur. An intramedullary hole was drilled and we placed the distal cutting guide at 5 degrees of valgus. The block was pinned into place and the distal cut was made in the usual fashion. We then placed the AP sizing guide and it was felt that a size 6 would give us the best fit. This was placed in 3 degrees of external rotation which matched up nicely with Alleghany's line as well as the epicondylar access. The 4-in-1 cutting block was pinned into place. The anterior, posterior and chamfer cuts were then made in the usual fashion. The trial femoral component was placed and the box cut was made with the reamer and box chisel in the usual fashion. At this point, we trialed the knee. We placed a 11 mm spacer. We had excellent balance throughout. The tibial trial component was removed after it was marked in proper alignment. The patella was then clamped with 2 towel clips. An oscillating saw was used to make a flat cut. The patella was sized to a round 41 mm patella, the lug holes were drilled and then undermined in the usual fashion with a curette. The trial patella  was placed. It fit nicely and sat down completely. All trial components were removed at this point. All bony surfaces were drilled with a small 3 mm drill bit and then thoroughly irrigated and dried. The rest of the local anesthetic was injected into the posterior capsule. Simplex cement mixed with tobramycin was then placed on the tibia and tibial component. The tibial component was impacted in place and the excess cement was removed. Next, cement was placed on the femoral component and on the femur and the femoral component was impacted into place. The excess cement was removed. A size 11 spacer was placed and the knee fully extended. The patella was then irrigated, dried and cement was placed onto the patella and patellar component. The patellar component was clamped in place and the excess cement removed. The knee was then lavaged with a dilute Betadine solution for 3 minutes and then irrigated again with normal saline to remove all the Betadine. The permanent 11 mm spacer was inserted with the  tool. The patella was relocated, a gram of vancomycin powder was placed in the joint, the tourniquet was deflated, and then the retinaculum was closed with a running #2 Quill suture. The subdermal layer was closed with a 0 Stratafix running suture, subdermal layer closed with a 2-0 Stratafix running suture and the skin closed with 3-0 Quill. Steri-Strips, as well as sterile dressings, an ACE bandage and ice pack were placed. There were no complications. Sponge counts correct. Tourniquet time 53 minutes. The patient was taken to PAR in stable condition.     DRAINS  None    SPECIMENS  None    COMPLICATIONS  None    ESTIMATED BLOOD LOSS  25 mL    CONDITION ON DISCHARGE FROM OR  Satisfactory    PLAN  1. Antibiotic Prophylaxis was given included Ancef 2 gm. Antibiotics given within 1 hour of surgical incision and discontinued within 24 hrs.   2. DVT prophylaxis ASA given within 24 hours    3. Weight Bearing WBAT  (Weight bearing as tolerated).   4. Discharge anticipated date POD #1 to Home vs Rehabilitation Facility   5. Pain Medication oxycodone, Tylenol and Celebrex  6. Change dressing on POD #1. Discharge with AG dressing.    JAKE HULL MD      @C(1)@ [facility] - [phone]

## 2022-01-04 NOTE — PROGRESS NOTES
Pt arrived from PACU rating knee pain #3.  Knee drsg dry and intact.  Pt has baseline danielito LE edema and venous stasis.  Denies nausea or urge to void.  Pt is morbidly obese and felt comfortable in bed so only ordering ginna equipment of commode, walker, and W/C.

## 2022-01-04 NOTE — ANESTHESIA PREPROCEDURE EVALUATION
Anesthesia Pre-Procedure Evaluation    Patient: Delmer Miguel   MRN: [id number] : 1950        Preoperative Diagnosis: Osteoarthritis of right knee, unspecified osteoarthritis type [M17.11]    Procedure : Procedure(s):  RIGHT TOTAL KNEE ARTHROPLASTY          History reviewed. No pertinent past medical history.   History reviewed. No pertinent surgical history.   No Known Allergies   Social History     Tobacco Use     Smoking status: Former Smoker     Packs/day: 1.00     Years: 2.00     Pack years: 2.00     Types: Cigarettes, Cigars     Smokeless tobacco: Never Used     Tobacco comment: hasn't smoked anything in 25 years   Substance Use Topics     Alcohol use: Not on file     Comment: 1-2 beer every four months      Wt Readings from Last 1 Encounters:   22 143.1 kg (315 lb 8 oz)        No Known Allergies  Prior to Admission medications    Medication Sig Start Date End Date Taking? Authorizing Provider   allopurinol (ZYLOPRIM) 300 MG tablet Take 300 mg by mouth daily   Yes Reported, Patient   amLODIPine (NORVASC) 10 MG tablet Take 10 mg by mouth daily   Yes Reported, Patient   aspirin 81 MG EC tablet Take 81 mg by mouth daily (with dinner)   Yes Reported, Patient   atorvastatin (LIPITOR) 80 MG tablet Take 80 mg by mouth daily   Yes Reported, Patient   fish oil-omega-3 fatty acids 1000 MG capsule Take 1 g by mouth daily   Yes Reported, Patient   furosemide (LASIX) 40 MG tablet Take 40 mg by mouth 2 times daily   Yes Reported, Patient   glipiZIDE (GLUCOTROL XL) 2.5 MG 24 hr tablet Take 2.5 mg by mouth daily (with breakfast) Along with 5 mg dose (2.5 mg + 5 mg = 7.5 mg)   Yes Reported, Patient   glipiZIDE (GLUCOTROL XL) 5 MG 24 hr tablet Take 5 mg by mouth daily (with breakfast) Along with the 2.5 mg dose (5 mg + 2.5 = 7.5 mg)   Yes Reported, Patient   labetalol (NORMODYNE) 200 MG tablet Take 200 mg by mouth 3 times daily   Yes Reported, Patient   levothyroxine (SYNTHROID/LEVOTHROID) 175 MCG tablet Take  175 mcg by mouth daily Along with 50 mcg dose (175 mcg + 75 mcg = 250 mcg)   Yes Reported, Patient   levothyroxine (SYNTHROID/LEVOTHROID) 50 MCG tablet Take 75 mcg by mouth daily (1.5 x 50 mg) Along with 175 mcg dose (75 mcg + 175 mcg = 250 mcg)   Yes Reported, Patient   liraglutide (VICTOZA) 18 MG/3ML solution Inject 1.8 mg Subcutaneous daily   Yes Reported, Patient   losartan (COZAAR) 100 MG tablet Take 100 mg by mouth daily   Yes Reported, Patient   nystatin (MYCOSTATIN) 240903 UNIT/GM external ointment Apply 1 g topically daily as needed    Yes Reported, Patient   potassium chloride ER (KLOR-CON M) 10 MEQ CR tablet Take 10 mEq by mouth daily   Yes Reported, Patient     RECENT LABS:   ECG:   ECHO:   CXR:      Anesthesia Evaluation   Pt has had prior anesthetic. Type: General.    No history of anesthetic complications       ROS/MED HX  ENT/Pulmonary:    (-) tobacco use, asthma and sleep apnea   Neurologic:    (-) no seizures, no CVA and migraines   Cardiovascular:     (+) Dyslipidemia hypertension----- (-) CAD, NINA, arrhythmias and valvular problems/murmurs   METS/Exercise Tolerance: >4 METS    Hematologic:    (-) history of blood clots and anemia   Musculoskeletal: Comment: Gout    (+) arthritis,     GI/Hepatic:    (-) GERD and liver disease   Renal/Genitourinary:    (-) renal disease and nephrolithiasis   Endo:     (+) type II DM, thyroid problem, hypothyroidism, Obesity (morbid obesity),  (-) Type I DM   Psychiatric/Substance Use:    (-) psychiatric history   Infectious Disease:    (-) Recent Fever   Malignancy:       Other:            Physical Exam    Airway        Mallampati: II   TM distance: > 3 FB   Neck ROM: full   Mouth opening: > 3 cm    Respiratory Devices and Support         Dental  no notable dental history         Cardiovascular   cardiovascular exam normal       Rhythm and rate: normal     Pulmonary   pulmonary exam normal        breath sounds clear to auscultation           OUTSIDE LABS:  CBC:    Lab Results   Component Value Date    HGB 15.0 01/04/2022     BMP:   Lab Results   Component Value Date    POTASSIUM 3.7 01/04/2022    CR 1.59 (H) 01/04/2022     (H) 01/04/2022     COAGS: No results found for: PTT, INR, FIBR  POC: No results found for: BGM, HCG, HCGS  HEPATIC: No results found for: ALBUMIN, PROTTOTAL, ALT, AST, GGT, ALKPHOS, BILITOTAL, BILIDIRECT, SOPHY  OTHER: No results found for: PH, LACT, A1C, ALFA, PHOS, MAG, LIPASE, AMYLASE, TSH, T4, T3, CRP, SED    Anesthesia Plan    ASA Status:  3   NPO Status:  NPO Appropriate    Anesthesia Type: Spinal.              Consents    Anesthesia Plan(s) and associated risks, benefits, and realistic alternatives discussed. Questions answered and patient/representative(s) expressed understanding.    - Discussed:     - Discussed with:  Patient         Postoperative Care    Pain management: Peripheral nerve block (Single Shot).   PONV prophylaxis: Ondansetron (or other 5HT-3)     Comments:                Lenka Glass MD

## 2022-01-04 NOTE — PROGRESS NOTES
PTA medications completed by Medication Scribe day of surgery     Medication history sources: Patient's family/friend (Jeimy (spouse)), Surescripts and H&P  In the past week, patient estimated taking medication this percent of the time: Greater than 90%  Adherence assessment: N/A Not Observed    Significant changes made to the medication list:  Patient reports no longer taking the following meds (med scribe removed from PTA med list): Betamethasone Dipropionate Cream      Additional medication history information:   None    Medication reconciliation completed by provider prior to medication history? No    Time spent in this activity: 20 minutes    The information provided in this note is only as accurate as the sources available at the time of update(s)      Prior to Admission medications    Medication Sig Last Dose Taking? Auth Provider   allopurinol (ZYLOPRIM) 300 MG tablet Take 300 mg by mouth daily 1/4/2022 at am Yes Reported, Patient   amLODIPine (NORVASC) 10 MG tablet Take 10 mg by mouth daily 1/4/2022 at am Yes Reported, Patient   aspirin 81 MG EC tablet Take 81 mg by mouth daily (with dinner) 12/31/2021 at pm Yes Reported, Patient   atorvastatin (LIPITOR) 80 MG tablet Take 80 mg by mouth daily 1/4/2022 at am Yes Reported, Patient   fish oil-omega-3 fatty acids 1000 MG capsule Take 1 g by mouth daily 1/3/2022 at am Yes Reported, Patient   furosemide (LASIX) 40 MG tablet Take 40 mg by mouth 2 times daily 1/3/2022 at pm Yes Reported, Patient   glipiZIDE (GLUCOTROL XL) 2.5 MG 24 hr tablet Take 2.5 mg by mouth daily (with breakfast) Along with 5 mg dose (2.5 mg + 5 mg = 7.5 mg) 1/3/2022 at am Yes Reported, Patient   glipiZIDE (GLUCOTROL XL) 5 MG 24 hr tablet Take 5 mg by mouth daily (with breakfast) Along with the 2.5 mg dose (5 mg + 2.5 = 7.5 mg) 1/3/2022 at am Yes Reported, Patient   labetalol (NORMODYNE) 200 MG tablet Take 200 mg by mouth 3 times daily 1/3/2022 at pm Yes Reported, Patient   levothyroxine  (SYNTHROID/LEVOTHROID) 175 MCG tablet Take 175 mcg by mouth daily Along with 50 mcg dose (175 mcg + 75 mcg = 250 mcg) 1/4/2022 at am Yes Reported, Patient   levothyroxine (SYNTHROID/LEVOTHROID) 50 MCG tablet Take 75 mcg by mouth daily (1.5 x 50 mg) Along with 175 mcg dose (75 mcg + 175 mcg = 250 mcg) 1/4/2022 at am Yes Reported, Patient   liraglutide (VICTOZA) 18 MG/3ML solution Inject 1.8 mg Subcutaneous daily 1/3/2022 at am Yes Reported, Patient   losartan (COZAAR) 100 MG tablet Take 100 mg by mouth daily 1/4/2022 at am Yes Reported, Patient   nystatin (MYCOSTATIN) 622507 UNIT/GM external ointment Apply 1 g topically daily as needed   at prn Yes Reported, Patient   potassium chloride ER (KLOR-CON M) 10 MEQ CR tablet Take 10 mEq by mouth daily 1/3/2022 at pm Yes Reported, Patient     Medication history completed by:    Froylan Reardon CPhT  Medication [facility]

## 2022-01-04 NOTE — ANESTHESIA CARE TRANSFER NOTE
Patient: Delmer Miguel    Procedure: Procedure(s):  RIGHT TOTAL KNEE ARTHROPLASTY       Diagnosis: Osteoarthritis of right knee, unspecified osteoarthritis type [M17.11]  Diagnosis Additional Information: No value filed.    Anesthesia Type:   Spinal     Note:    Oropharynx: oropharynx clear of all foreign objects and spontaneously breathing  Level of Consciousness: awake  Oxygen Supplementation: face mask  Level of Supplemental Oxygen (L/min / FiO2): 6  Independent Airway: airway patency satisfactory and stable  Dentition: dentition unchanged  Vital Signs Stable: post-procedure vital signs reviewed and stable  Report to RN Given: handoff report given  Patient transferred to: PACU  Comments: At end of procedure, spontaneous respirations, patient alert to voice, able to follow commands. Oxygen via facemask at 6 liters per minute to PACU. Oxygen tubing connected to wall O2 in PACU, SpO2, NiBP, and EKG monitors and alarms on and functioning, Shiva Hugger warmer connected to patient gown, report on patient's clinical status given to PACU RN, RN questions answered.  Handoff Report: Identifed the Patient, Identified the Reponsible Provider, Reviewed the pertinent medical history, Discussed the surgical course, Reviewed Intra-OP anesthesia mangement and issues during anesthesia, Set expectations for post-procedure period and Allowed opportunity for questions and acknowledgement of understanding      Vitals:  Vitals Value Taken Time   /71 01/04/22 1313   Temp     Pulse 62 01/04/22 1316   Resp 16 01/04/22 1316   SpO2 97 % 01/04/22 1316   Vitals shown include unvalidated device data.    Electronically Signed By: BLAIR Magdaleno CRNA  January 4, 2022  1:17 PM

## 2022-01-05 ENCOUNTER — APPOINTMENT (OUTPATIENT)
Dept: OCCUPATIONAL THERAPY | Facility: CLINIC | Age: 72
End: 2022-01-05
Attending: ORTHOPAEDIC SURGERY
Payer: MEDICARE

## 2022-01-05 ENCOUNTER — APPOINTMENT (OUTPATIENT)
Dept: PHYSICAL THERAPY | Facility: CLINIC | Age: 72
End: 2022-01-05
Attending: ORTHOPAEDIC SURGERY
Payer: MEDICARE

## 2022-01-05 VITALS
HEART RATE: 71 BPM | RESPIRATION RATE: 16 BRPM | TEMPERATURE: 100 F | OXYGEN SATURATION: 90 % | SYSTOLIC BLOOD PRESSURE: 152 MMHG | WEIGHT: 315 LBS | HEIGHT: 72 IN | BODY MASS INDEX: 42.66 KG/M2 | DIASTOLIC BLOOD PRESSURE: 71 MMHG

## 2022-01-05 LAB
ANION GAP SERPL CALCULATED.3IONS-SCNC: 4 MMOL/L (ref 3–14)
BUN SERPL-MCNC: 23 MG/DL (ref 7–30)
CALCIUM SERPL-MCNC: 8.5 MG/DL (ref 8.5–10.1)
CHLORIDE BLD-SCNC: 108 MMOL/L (ref 94–109)
CO2 SERPL-SCNC: 26 MMOL/L (ref 20–32)
CREAT SERPL-MCNC: 1.38 MG/DL (ref 0.66–1.25)
GFR SERPL CREATININE-BSD FRML MDRD: 55 ML/MIN/1.73M2
GLUCOSE BLD-MCNC: 225 MG/DL (ref 70–99)
GLUCOSE BLDC GLUCOMTR-MCNC: 124 MG/DL (ref 70–99)
GLUCOSE BLDC GLUCOMTR-MCNC: 162 MG/DL (ref 70–99)
GLUCOSE BLDC GLUCOMTR-MCNC: 251 MG/DL (ref 70–99)
HGB BLD-MCNC: 13.5 G/DL (ref 13.3–17.7)
POTASSIUM BLD-SCNC: 3.4 MMOL/L (ref 3.4–5.3)
SODIUM SERPL-SCNC: 138 MMOL/L (ref 133–144)

## 2022-01-05 PROCEDURE — 250N000011 HC RX IP 250 OP 636: Performed by: PHYSICIAN ASSISTANT

## 2022-01-05 PROCEDURE — 85018 HEMOGLOBIN: CPT | Performed by: PHYSICIAN ASSISTANT

## 2022-01-05 PROCEDURE — 36415 COLL VENOUS BLD VENIPUNCTURE: CPT | Performed by: PHYSICIAN ASSISTANT

## 2022-01-05 PROCEDURE — 250N000013 HC RX MED GY IP 250 OP 250 PS 637: Performed by: PHYSICIAN ASSISTANT

## 2022-01-05 PROCEDURE — 82962 GLUCOSE BLOOD TEST: CPT

## 2022-01-05 PROCEDURE — 97110 THERAPEUTIC EXERCISES: CPT | Mod: GP,CQ | Performed by: PHYSICAL THERAPY ASSISTANT

## 2022-01-05 PROCEDURE — 99213 OFFICE O/P EST LOW 20 MIN: CPT | Performed by: HOSPITALIST

## 2022-01-05 PROCEDURE — 97116 GAIT TRAINING THERAPY: CPT | Mod: GP,CQ | Performed by: PHYSICAL THERAPY ASSISTANT

## 2022-01-05 PROCEDURE — 97535 SELF CARE MNGMENT TRAINING: CPT | Mod: GO | Performed by: OCCUPATIONAL THERAPIST

## 2022-01-05 PROCEDURE — 250N000013 HC RX MED GY IP 250 OP 250 PS 637: Performed by: ORTHOPAEDIC SURGERY

## 2022-01-05 PROCEDURE — 97166 OT EVAL MOD COMPLEX 45 MIN: CPT | Mod: GO | Performed by: OCCUPATIONAL THERAPIST

## 2022-01-05 PROCEDURE — 97530 THERAPEUTIC ACTIVITIES: CPT | Mod: GP,CQ | Performed by: PHYSICAL THERAPY ASSISTANT

## 2022-01-05 PROCEDURE — 96372 THER/PROPH/DIAG INJ SC/IM: CPT | Performed by: PHYSICIAN ASSISTANT

## 2022-01-05 PROCEDURE — 99207 PR CDG-CODE CATEGORY CHANGED: CPT | Performed by: HOSPITALIST

## 2022-01-05 PROCEDURE — 250N000012 HC RX MED GY IP 250 OP 636 PS 637: Performed by: PHYSICIAN ASSISTANT

## 2022-01-05 PROCEDURE — 80048 BASIC METABOLIC PNL TOTAL CA: CPT | Performed by: PHYSICIAN ASSISTANT

## 2022-01-05 PROCEDURE — 97530 THERAPEUTIC ACTIVITIES: CPT | Mod: GO | Performed by: OCCUPATIONAL THERAPIST

## 2022-01-05 RX ORDER — ASPIRIN 81 MG/1
162 TABLET ORAL DAILY
Qty: 120 TABLET | Refills: 0 | Status: SHIPPED | OUTPATIENT
Start: 2022-01-05

## 2022-01-05 RX ORDER — LOSARTAN POTASSIUM 100 MG/1
100 TABLET ORAL DAILY
Refills: 0 | COMMUNITY
Start: 2022-01-07

## 2022-01-05 RX ORDER — CELECOXIB 200 MG/1
200 CAPSULE ORAL DAILY
Qty: 40 CAPSULE | Refills: 0 | Status: SHIPPED | OUTPATIENT
Start: 2022-01-05 | End: 2022-01-05

## 2022-01-05 RX ORDER — ACETAMINOPHEN 325 MG/1
650 TABLET ORAL EVERY 4 HOURS PRN
Qty: 100 TABLET | Refills: 0 | Status: SHIPPED | OUTPATIENT
Start: 2022-01-05

## 2022-01-05 RX ORDER — AMOXICILLIN 250 MG
1-2 CAPSULE ORAL 2 TIMES DAILY
Qty: 100 TABLET | Refills: 0 | Status: SHIPPED | OUTPATIENT
Start: 2022-01-05

## 2022-01-05 RX ORDER — OXYCODONE HYDROCHLORIDE 5 MG/1
5-10 TABLET ORAL EVERY 4 HOURS PRN
Qty: 50 TABLET | Refills: 0 | Status: SHIPPED | OUTPATIENT
Start: 2022-01-05

## 2022-01-05 RX ADMIN — LEVOTHYROXINE SODIUM 175 MCG: 100 TABLET ORAL at 06:39

## 2022-01-05 RX ADMIN — ATORVASTATIN CALCIUM 80 MG: 40 TABLET, FILM COATED ORAL at 08:31

## 2022-01-05 RX ADMIN — AMLODIPINE BESYLATE 10 MG: 10 TABLET ORAL at 08:32

## 2022-01-05 RX ADMIN — LABETALOL HCL 200 MG: 200 TABLET, FILM COATED ORAL at 08:32

## 2022-01-05 RX ADMIN — ASPIRIN 162 MG: 81 TABLET, COATED ORAL at 08:31

## 2022-01-05 RX ADMIN — INSULIN ASPART 3 UNITS: 100 INJECTION, SOLUTION INTRAVENOUS; SUBCUTANEOUS at 13:06

## 2022-01-05 RX ADMIN — INSULIN ASPART 1 UNITS: 100 INJECTION, SOLUTION INTRAVENOUS; SUBCUTANEOUS at 09:14

## 2022-01-05 RX ADMIN — SENNOSIDES AND DOCUSATE SODIUM 1 TABLET: 50; 8.6 TABLET ORAL at 08:33

## 2022-01-05 RX ADMIN — LEVOTHYROXINE SODIUM 75 MCG: 75 TABLET ORAL at 06:39

## 2022-01-05 RX ADMIN — ACETAMINOPHEN 975 MG: 325 TABLET, FILM COATED ORAL at 09:14

## 2022-01-05 RX ADMIN — Medication 3 G: at 05:07

## 2022-01-05 RX ADMIN — FUROSEMIDE 40 MG: 40 TABLET ORAL at 08:32

## 2022-01-05 RX ADMIN — POLYETHYLENE GLYCOL 3350 17 G: 17 POWDER, FOR SOLUTION ORAL at 08:31

## 2022-01-05 RX ADMIN — ALLOPURINOL 300 MG: 300 TABLET ORAL at 08:33

## 2022-01-05 RX ADMIN — ACETAMINOPHEN 975 MG: 325 TABLET, FILM COATED ORAL at 01:05

## 2022-01-05 RX ADMIN — OXYCODONE HYDROCHLORIDE 10 MG: 5 TABLET ORAL at 13:23

## 2022-01-05 RX ADMIN — OXYCODONE HYDROCHLORIDE 10 MG: 5 TABLET ORAL at 05:02

## 2022-01-05 NOTE — PROGRESS NOTES
01/05/22 0732   Quick Adds   Type of Visit Initial Occupational Therapy Evaluation   Living Environment   People in home spouse   Current Living Arrangements house   Home Accessibility no concerns   Transportation Anticipated family or friend will provide;car, drives self   Self-Care   Equipment Currently Used at Home raised toilet seat   Activity/Exercise/Self-Care Comment walk in shower, higher toilet with RTS with arms.    Instrumental Activities of Daily Living (IADL)   Previous Responsibilities work;driving;finances;medication management;yardwork;shopping;meal prep;housekeeping;laundry   IADL Comments part time contract , wife will A with ADL/IADL's when return home.    Disability/Function   Fall history within last six months no   Change in Functional Status Since Onset of Current Illness/Injury yes   General Information   Onset of Illness/Injury or Date of Surgery 01/04/22   Referring Physician Malick Priest PA-C   Patient/Family Therapy Goal Statement (OT) home   Additional Occupational Profile Info/Pertinent History of Current Problem Right knee arthroplasty 1/4/22   Existing Precautions/Restrictions fall   Right Lower Extremity (Weight-bearing Status) weight-bearing as tolerated (WBAT)   Cognitive Status Examination   Orientation Status orientation to person, place and time   Affect/Mental Status (Cognitive) WNL   Follows Commands WNL   Sensory   Sensory Quick Adds No deficits were identified   Pain Assessment   Patient Currently in Pain   (no pain at rest)   Range of Motion Comprehensive   Comment, General Range of Motion B UE AROM WFL   Strength Comprehensive (MMT)   Comment, General Manual Muscle Testing (MMT) Assessment B UE strength WFL   Bed Mobility   Supine-Sit [place] (Bed Mobility) supervision  (HOB raised)   Transfer Skill: Bed to Chair/Chair to Bed   Bed-Chair [place] (Transfers) supervision   Assistive Device (Bed-Chair Transfers) rolling walker   Sit-Stand  "Transfer   Sit-Stand [place] (Transfers) supervision   Assistive Device (Sit-Stand Transfers) walker, front-wheeled   Toilet Transfer   [place] Level (Toilet Transfer) supervision   Assistive Device (Toilet Transfer) walker, front-wheeled;commode chair  (Bariatric commode)   Lower Body Dressing Assessment   [place] Level (Lower Body Dressing) moderate assist (50% patient effort)   Clinical Impression   Criteria for Skilled Therapeutic Interventions Met (OT) yes   OT Diagnosis impaired ADls and functional mobility   OT Problem List-Impairments impacting ADL problems related to;pain;strength;balance   Assessment of Occupational Performance 3-5 Performance Deficits   Identified Performance Deficits impaired LE dress, walk in shower transfer, snow removal, driving, etc   Planned Therapy Interventions (OT) ADL retraining;transfer training   Clinical Decision Making Complexity (OT) moderate complexity   Therapy Frequency (OT) 1x eval and treat   Risk & Benefits of therapy have been explained evaluation/treatment results reviewed;care plan/treatment goals reviewed;risks/benefits reviewed;current/potential barriers reviewed;participants voiced agreement with care plan;participants included;patient   OT Discharge Planning    OT Rationale for DC Rec Recommend home with wife/family A with ADl/IADL\"s ie LE dress, shower and car transfer, snow removal , driving, etc    OT Brief overview of current status  LE dress min/mod A, Toilet transfer mod I/S, pt has RTS with arms at home.    Total Evaluation Time (Minutes)   Total Evaluation Time (Minutes) 10     "

## 2022-01-05 NOTE — PROGRESS NOTES
01/04/22 1900   Quick Adds   Type of Visit Initial PT Evaluation   Living Environment   People in home spouse   Current Living Arrangements house  (single level )   Number of Stairs, Main Entrance none   Transportation Anticipated family or friend will provide   Living Environment Comments spouse home 24/7 for assist   Self-Care   Usual Activity Tolerance good   Current Activity Tolerance fair   Equipment Currently Used at Home none   Activity/Exercise/Self-Care Comment pt was ind with all mobility prior to surgery without use of AD, ind with ADL's, IADL's   Disability/Function   Fall history within last six months no   General Information   Onset of Illness/Injury or Date of Surgery 01/04/22   Referring Physician Malick Priest PA-C   Patient/Family Therapy Goals Statement (PT) return home with help from spouse   Pertinent History of Current Problem (include personal factors and/or comorbidities that impact the POC) Pt is a 71 y.o. male s/p R TKA on 1/4/22, POD#0   Existing Precautions/Restrictions fall   Weight-Bearing Status - RLE weight-bearing as tolerated   Cognition   Orientation Status (Cognition) oriented x 3   Affect/Mental Status (Cognition) WNL   Follows Commands (Cognition) WNL   Pain Assessment   Patient Currently in Pain Yes, see Vital Sign flowsheet  (2/10 right knee)   Posture    Posture Forward head position;Protracted shoulders   Range of Motion (ROM)   ROM Quick Adds ROM deficits secondary to surgical procedure   ROM Comment impaird RLE secondary to surgery, otherwise grossly appears WNL    Strength   Manual Muscle Testing Quick Adds Able to perform R SLR;Able to perform L SLR;Deficits observed during functional mobility   Strength Comments deficits in RLE secondary to surgery, LLE appears antigravity   Bed Mobility   Comment (Bed Mobility) supine<>sit with HOB elevated, Mod-I    Transfers   Transfer Safety Comments sit<>stand with FWW, CGA   Gait/Stairs (Locomotion)   [place] Level  (Gait) contact guard   Assistive Device (Gait) walker, front-wheeled   Distance in Feet (Required for LE Total Joints) 10' + 180'    Pattern (Gait) step-through   Comment (Gait/Stairs) amb with FWW and CGA    Balance   Balance Comments sitting EOB unsupported, standing balance maintained with FWW, dynamic balance with FWW adequate   Sensory Examination   Sensory Perception Comments pt denies any deficits   Clinical Impression   Criteria for Skilled Therapeutic Intervention yes, treatment indicated   PT Diagnosis (PT) impaired gait   Influenced by the following impairments pain, reduced functional ROM, strength, and balance    Functional limitations due to impairments fall risk, reduced ind in all functional mobility, ADL's IADL's, reduced activity tolerance   Clinical Presentation Stable/Uncomplicated   Clinical Presentation Rationale PMH, clinical judgement   Clinical Decision Making (Complexity) low complexity   Therapy Frequency (PT) 2x/day   Predicted Duration of Therapy Intervention (days/wks) 2 days   Planned Therapy Interventions (PT) balance training;bed mobility training;gait training;home exercise program;patient/family education;ROM (range of motion);strengthening;stretching;transfer training;progressive activity/exercise   Anticipated Equipment Needs at Discharge (PT) walker, rolling   Risk & Benefits of therapy have been explained evaluation/treatment results reviewed;care plan/treatment goals reviewed;risks/benefits reviewed;current/potential barriers reviewed;participants voiced agreement with care plan;participants included;patient   PT Discharge Planning    PT Rationale for DC Rec Pt is currently below baseline but was moving well tonight. Anticipate pt will be mod-I with bed mobility, SBA for transfers and amb with FWW. Pt has good support at home with spouse who will be home 24/7 to provide all assistance    PT Brief overview of current status  bed mobility: mod-I, transfers and amb with FWW: CGA    Total Evaluation Time   Total Evaluation Time (Minutes) 8

## 2022-01-05 NOTE — CONSULTS
"Care Management Initial Consult    General Information  Assessment completed with: VM-chart review, Usha Gastelum PA-C  Type of CM/SW Visit: Initial Assessment  Primary Care Provider verified and updated as needed: Yes (Viktor Chadwick [phone] YOGESH NICOLLET)   Readmission within the last 30 days: no previous admission in last 30 days      Reason for Consult: discharge planning  Advance Care Planning: Advance Care Planning Reviewed: no concerns identified     General Information Comments: noted patient also has an active [email or web address]     Communication Assessment  Patient's communication style: spoken language (English or Bilingual)    Hearing Difficulty or Deaf: no   Wear Glasses or Blind: no    Cognitive  Cognitive/Neuro/Behavioral: WDL                      Living Environment:   People in home: spouse  OMAR [phone]   Current living Arrangements: house      Able to return to prior arrangements: yes    Family/Social Support:  Care provided by: self  Provides care for: no one  Marital Status:   Wife  OMAR [phone]        Description of Support System: Supportive,Involved    Support Assessment: Adequate family and caregiver support    Current Resources:   Patient receiving home care services: No   Community Resources: None  Equipment currently used at home: raised toilet seat  Supplies currently used at home: None    Employment/Financial:  Employment Status:     retired  Financial Concerns: No concerns identified    Finance Comments: active MEDICARE/MEDICARE and  BCBS[place] insurance     Lifestyle & Psychosocial Needs:  Outpatient \"social determinants of health\" assessment not done     Functional Status:  Prior to admission patient needed assistance: none   Assesssment of Functional Status:  (see therapy assessment )    Mental Health Status:  Mental Health Status: No Current Concerns       Chemical Dependency Status:  Chemical Dependency Status: No Current Concerns         "   Values/Beliefs:  Spiritual, Cultural Beliefs, Hoahaoism Practices, Values that affect care: no          Values/Beliefs Comment: Mosque      Care Management Discharge Note    Discharge Date: 01/05/2022     Discharge Disposition: Home,Outpatient Rehab (PT, OT)    Discharge Services: None    Discharge DME: Walker    Discharge Transportation: family or friend will provide    Private pay costs discussed: Not applicable    PAS Confirmation Code:  (n/a)  Patient/family educated on Medicare website which has current facility and service quality ratings: no    Education Provided on the Discharge Plan:    Persons Notified of Discharge Plans: discussed with PA  Patient/Family in Agreement with the Plan: yes    Handoff Referral Completed: No    Additional Information:  PAJesusC reviewed discharge recommendations with pt; pt already has 6 weeks outpatient therapies set up and does not require home care.      Randi Neville RN, BSN, PHN  [facility]  Inpatient Care Management - FLOAT  Mobile: [phone] 01/05/22 until 4pm  (after today's date, please call the patient's unit)

## 2022-01-05 NOTE — PROGRESS NOTES
A&O x4.   CMS Intact.   VSS on RA.   Up with Ax1 GB and walker.   Voiding in Urinal.   Pain controlled with PO Oxycodone, Tylenol.   Continue to monitor.    Reviewed discharge instructions and medications with patient:YES  Questions answered:YES  Patient discharged to:Home w/ Spouse  All belongs discharged with patient:YES

## 2022-01-05 NOTE — CONSULTS
"Consult ordered by Malick Priest PA-C for \"Home Care.\"   Progress note from Usha Gastelum PA-C today says \"Anticipated discharge date today . Discharge to Home with Outpatient Treatment.\"    OT assessment indicates \"Recommend home with wife/family A with ADl/IADL\"s ie LE dress, shower and car transfer, snow removal , driving, etc \"    Will perform chart review and page to clarify.     Randi Neville RN, BSN, PHN  [facility]  Inpatient Care Management - FLOAT  Mobile: [phone] 01/05/22 until 4pm  (after today's date, please call the patient's unit)     "

## 2022-01-05 NOTE — PROGRESS NOTES
ORTHOPEDIC LOWER EXTREMITY PROGRESS NOTE    POD#1  Patient is a 71 year old male who underwent Procedure(s):  RIGHT TOTAL KNEE ARTHROPLASTY on 2022. Pain is well controlled. Tolerating medication well, no nausea or vomiting. Voiding well.  Discharge instructions reviewed.      Vitals:   Blood pressure (!) 141/69, pulse 73, temperature 99  F (37.2  C), temperature source Oral, resp. rate 16, height 1.829 m (6'), weight 143.1 kg (315 lb 8 oz), SpO2 (!) 88 %.  Temp (24hrs), Av.1  F (36.7  C), Min:97.1  F (36.2  C), Max:99.3  F (37.4  C)      Drains: None    EXAM   The patient is awake and alert.  Calves are soft and non-tender.   Sensation is intact.  Dorsiflexion and plantar flexion is intact.  Foot warm with nl cap refill.  The incision is covered.     Labs:   Recent Labs   Lab Test 22  0910   HGB 15.0       ASSESSMENT  S/p R TKA   PLAN  1. DVT prophylaxis: aspirin  2. Weight Bearing: WBAT (Weight bearing as tolerated).  3. Anticipated discharge date today . Discharge to Home with Outpatient Treatment.  4. Cont Pain Control Oxycodone and Tylenol   5. Still awaiting morning hgb result    Usha Gastelum PA-C  [facility]

## 2022-01-05 NOTE — PROGRESS NOTES
[facility] [place]    Medicine Progress Note - Hospitalist Service       Date of Admission:  1/4/2022    Assessment & Plan              Delmer Miguel is a 71 year old male with PMH significant for non-insulin-dependent diabetes, hypertension, lipidemia edema, CKD stage III, hypothyroidism and obesity, ascending aorta dilation who was admitted to [facility] [place] on 1/4/2022 by the orthopedic service for right knee arthroplasty with Dr. Long.     Right knee arthroplasty 1/4/22  - Routine postoperative cares per primary service, including IVF, pain control, abx, DVT ppx, and disposition  - PT/OT as per primary service  - Aggressive pulmonary toilet, frequent IS use 10x/hour while awake     Chronic kidney disease, stage 3b  Baseline Cr 1.6. On admission, at baseline.  Follows with nephrology as an outpatient.  -- creatinine trending down  -- discourage all NSAID use  -- cont to avoid nephrotoxins - contrast, NSAIDs  -- hold ARB for a couple more days prior to restarting  -- follow up with Nephrologist on discharge     Benign essential hypertension  Chronic lower extremity edema  History of first-degree AV block, intraventricular block  PTA regimen: Amlodipine 10 mg daily, furosemide 40 mg twice daily, labetalol 200 mg 3 times daily, losartan 100 mg daily  - Continue PTA antihypertensives with hold parameters  - Resume PTA potassium with furosemide   - PRN IV hydralazine available for SBP >180  - Monitor BP trend and need for medication adjustments     Non-Insulin dependent type 2 diabetes  PTA Regimen: Glipizide and Victoza  Last HbA1c 6.9%  - Hypoglycemia protocol  - Preprandial and HS fingerstick checks or Q 4 hours while NPO  - Hold PTA oral antiglycemics, resume on discharge  - Sliding scale insulin Medium   - Long acting regimen: None  - Consistent CHO diet 75gm per meal         SARAH: Noncompliant with CPAP.  May use nasal cannula as needed    Other pertinent history and  chronic stable diagnoses: Appropriate PTA medications will be resumed.  Morbid obesity  Hypothyroidism  Gout  Ascending aorta dilatation  Normocytic anemia      Diet: Discharge Instruction - Regular Diet Adult  High Consistent Carb (75 g CHO per Meal) Diet    DVT Prophylaxis: Defer to primary service  Malone Catheter: Not present  Central Lines: None  Code Status: Full Code      Disposition Plan   Expected Discharge: 01/05/2022     Anticipated discharge location: home with family    Delays:           The patient's care was discussed with the Patient and Patient's Family.    Lisa Carrillo MD  Hospitalist Service  [facility]  Securely message with the Vocera Web Console (learn more here)  Text page via [employer] Paging/Directory        Clinically Significant Risk Factors Present on Admission              # Platelet Defect: home medication list includes an antiplatelet medication   # Diabetes, type II: last A1C 6.6 % (Ref range: 0.0 - 5.6 %)  # Severe Obesity: last Body mass index is 42.79 kg/m .      ______________________________________________________________________    Interval History   Patient to be discharged per ortho today.  Doing well, pain tolerable with pain meds  No complaints.    Data reviewed today: I reviewed all medications, new labs and imaging results over the last 24 hours. I personally reviewed no images or EKG's today.    Physical Exam   Vital Signs: Temp: 100  F (37.8  C) Temp src: Oral BP: (!) 152/71 Pulse: 71   Resp: 16 SpO2: 90 % O2 Device: None (Room air) Oxygen Delivery: 2 LPM  Weight: 315 lbs 8 oz  General Appearance: Well appearing for stated age.  Respiratory: CTAB, no rales or ronchi  Cardiovascular: S1, S2 normal, no murmurs  GI: non-tender on palpation, BS present      Data   Recent Labs   Lab 01/05/22  1214 01/05/22  0832 01/05/22  0742 01/04/22  1357 01/04/22  0910   HGB  --  13.5  --   --  15.0   NA  --  138  --   --   --    POTASSIUM  --  3.4  --   --  3.7    CHLORIDE  --  108  --   --   --    CO2  --  26  --   --   --    BUN  --  23  --   --   --    CR  --  1.38*  --   --  1.59*   ANIONGAP  --  4  --   --   --    ALFA  --  8.5  --   --   --    * 225* 162*   < > 158*    < > = values in this interval not displayed.     No results found for this or any previous visit (from the past 24 hour(s)).

## 2022-01-05 NOTE — PLAN OF CARE
"Occupational Therapy Discharge Summary    Reason for therapy discharge:    All goals and outcomes met, no further needs identified.    Progress towards therapy goal(s). See goals on Care Plan in [facility] electronic health record for goal details.  Goals met    Therapy recommendation(s):    Recommend home with wife/family A with ADl/IADL\"s ie LE dress, shower and car transfer, snow removal , driving, etc       "

## 2022-01-05 NOTE — PROGRESS NOTES
Patient vital signs are at baseline: yes  Patient able to ambulate as they were prior to admission or with assist devices provided by therapies during their stay:  yes  Patient MUST void prior to discharge:  yes  Patient able to tolerate oral intake:  yes  Pain has adequate pain control using Oral analgesics:  Yes    Pt voided 600cc. Rates pain #3 after taking Oxycodone and Atarax.  Knee drsg dry and intact.  CMS good danielito to ft.  Pt has baseline LE edema to ankles and ft.  Blood sugar was 125 before supper.  Denies nausea.  Pt refuses to use CPap at HS.  States he will use O2 instead.

## 2022-02-06 ENCOUNTER — HEALTH MAINTENANCE LETTER (OUTPATIENT)
Age: 72
End: 2022-02-06

## 2022-07-21 ENCOUNTER — APPOINTMENT (OUTPATIENT)
Dept: URBAN - METROPOLITAN AREA CLINIC 252 | Age: 72
Setting detail: DERMATOLOGY
End: 2022-07-22

## 2022-07-21 VITALS — HEIGHT: 73 IN | WEIGHT: 302 LBS

## 2022-07-21 DIAGNOSIS — L57.0 ACTINIC KERATOSIS: ICD-10-CM

## 2022-07-21 DIAGNOSIS — D22 MELANOCYTIC NEVI: ICD-10-CM

## 2022-07-21 DIAGNOSIS — L73.8 OTHER SPECIFIED FOLLICULAR DISORDERS: ICD-10-CM

## 2022-07-21 DIAGNOSIS — Z71.89 OTHER SPECIFIED COUNSELING: ICD-10-CM

## 2022-07-21 DIAGNOSIS — L82.1 OTHER SEBORRHEIC KERATOSIS: ICD-10-CM

## 2022-07-21 DIAGNOSIS — D18.0 HEMANGIOMA: ICD-10-CM

## 2022-07-21 DIAGNOSIS — L72.8 OTHER FOLLICULAR CYSTS OF THE SKIN AND SUBCUTANEOUS TISSUE: ICD-10-CM

## 2022-07-21 DIAGNOSIS — Z85.828 PERSONAL HISTORY OF OTHER MALIGNANT NEOPLASM OF SKIN: ICD-10-CM

## 2022-07-21 PROBLEM — D22.5 MELANOCYTIC NEVI OF TRUNK: Status: ACTIVE | Noted: 2022-07-21

## 2022-07-21 PROBLEM — D22.39 MELANOCYTIC NEVI OF OTHER PARTS OF FACE: Status: ACTIVE | Noted: 2022-07-21

## 2022-07-21 PROBLEM — D18.01 HEMANGIOMA OF SKIN AND SUBCUTANEOUS TISSUE: Status: ACTIVE | Noted: 2022-07-21

## 2022-07-21 PROCEDURE — 99203 OFFICE O/P NEW LOW 30 MIN: CPT | Mod: 25

## 2022-07-21 PROCEDURE — OTHER MIPS QUALITY: OTHER

## 2022-07-21 PROCEDURE — OTHER COUNSELING: OTHER

## 2022-07-21 PROCEDURE — OTHER LIQUID NITROGEN: OTHER

## 2022-07-21 PROCEDURE — 17000 DESTRUCT PREMALG LESION: CPT

## 2022-07-21 PROCEDURE — 17003 DESTRUCT PREMALG LES 2-14: CPT

## 2022-07-21 ASSESSMENT — LOCATION DETAILED DESCRIPTION DERM
LOCATION DETAILED: EPIGASTRIC SKIN
LOCATION DETAILED: LEFT LATERAL FOREHEAD
LOCATION DETAILED: RIGHT SUPERIOR PARIETAL SCALP
LOCATION DETAILED: RIGHT INFERIOR MEDIAL UPPER BACK
LOCATION DETAILED: LEFT FOREHEAD
LOCATION DETAILED: RIGHT SUPERIOR UPPER BACK
LOCATION DETAILED: RIGHT INFERIOR CENTRAL MALAR CHEEK
LOCATION DETAILED: LEFT DISTAL DORSAL FOREARM
LOCATION DETAILED: RIGHT FOREHEAD
LOCATION DETAILED: LEFT SUPERIOR PARIETAL SCALP
LOCATION DETAILED: INFERIOR THORACIC SPINE
LOCATION DETAILED: RIGHT CLAVICULAR NECK
LOCATION DETAILED: POSTERIOR MID-PARIETAL SCALP
LOCATION DETAILED: LEFT MID TEMPLE
LOCATION DETAILED: SUPERIOR MID FOREHEAD

## 2022-07-21 ASSESSMENT — LOCATION SIMPLE DESCRIPTION DERM
LOCATION SIMPLE: LEFT FOREARM
LOCATION SIMPLE: SUPERIOR FOREHEAD
LOCATION SIMPLE: RIGHT FOREHEAD
LOCATION SIMPLE: LEFT TEMPLE
LOCATION SIMPLE: POSTERIOR SCALP
LOCATION SIMPLE: UPPER BACK
LOCATION SIMPLE: RIGHT ANTERIOR NECK
LOCATION SIMPLE: SCALP
LOCATION SIMPLE: ABDOMEN
LOCATION SIMPLE: RIGHT UPPER BACK
LOCATION SIMPLE: RIGHT CHEEK
LOCATION SIMPLE: LEFT FOREHEAD

## 2022-07-21 ASSESSMENT — LOCATION ZONE DERM
LOCATION ZONE: ARM
LOCATION ZONE: NECK
LOCATION ZONE: TRUNK
LOCATION ZONE: FACE
LOCATION ZONE: SCALP

## 2022-07-21 NOTE — PROCEDURE: LIQUID NITROGEN
Detail Level: Detailed
Render Post-Care Instructions In Note?: yes
Duration Of Freeze Thaw-Cycle (Seconds): 0
Consent: - Discussed that these are a result of cumulative sun exposure.\\n- Verbal and written consent was obtained, and risks were reviewed prior to procedure today. \\n- Risks discussed include but are not limited to pain, crusting, scabbing, blistering, scarring, temporary or permanent darker or lighter pigmentary change, recurrence, incomplete resolution, and infection.
Post-Care Instructions: - Patient was instructed to avoid picking at any of the treated lesions.

## 2022-07-21 NOTE — PROCEDURE: MIPS QUALITY
Quality 226: Preventive Care And Screening: Tobacco Use: Screening And Cessation Intervention: Patient screened for tobacco use and is an ex/non-smoker
Quality 110: Preventive Care And Screening: Influenza Immunization: Influenza Immunization previously received during influenza season
Quality 431: Preventive Care And Screening: Unhealthy Alcohol Use - Screening: Patient not identified as an unhealthy alcohol user when screened for unhealthy alcohol use using a systematic screening method
Quality 402: Tobacco Use And Help With Quitting Among Adolescents: Patient screened for tobacco and never smoked
Detail Level: Detailed
Quality 130: Documentation Of Current Medications In The Medical Record: Current Medications Documented

## 2022-10-03 ENCOUNTER — HEALTH MAINTENANCE LETTER (OUTPATIENT)
Age: 72
End: 2022-10-03

## 2022-10-10 ENCOUNTER — IMMUNIZATION (OUTPATIENT)
Dept: FAMILY MEDICINE | Facility: CLINIC | Age: 72
End: 2022-10-10
Payer: MEDICARE

## 2022-10-10 PROCEDURE — 0124A COVID-19,PF,PFIZER BOOSTER BIVALENT: CPT

## 2022-10-10 PROCEDURE — G0008 ADMIN INFLUENZA VIRUS VAC: HCPCS | Mod: 59

## 2022-10-10 PROCEDURE — 91312 COVID-19,PF,PFIZER BOOSTER BIVALENT: CPT

## 2022-10-10 PROCEDURE — 90662 IIV NO PRSV INCREASED AG IM: CPT

## 2023-02-11 ENCOUNTER — HEALTH MAINTENANCE LETTER (OUTPATIENT)
Age: 73
End: 2023-02-11

## 2023-07-06 ENCOUNTER — APPOINTMENT (OUTPATIENT)
Dept: URBAN - METROPOLITAN AREA CLINIC 252 | Age: 73
Setting detail: DERMATOLOGY
End: 2023-07-06

## 2023-07-06 VITALS — HEIGHT: 72 IN | WEIGHT: 285 LBS

## 2023-07-06 DIAGNOSIS — L82.1 OTHER SEBORRHEIC KERATOSIS: ICD-10-CM

## 2023-07-06 DIAGNOSIS — L57.0 ACTINIC KERATOSIS: ICD-10-CM

## 2023-07-06 DIAGNOSIS — L73.8 OTHER SPECIFIED FOLLICULAR DISORDERS: ICD-10-CM

## 2023-07-06 PROCEDURE — OTHER PRESCRIPTION: OTHER

## 2023-07-06 PROCEDURE — 99213 OFFICE O/P EST LOW 20 MIN: CPT | Mod: 25

## 2023-07-06 PROCEDURE — 17000 DESTRUCT PREMALG LESION: CPT

## 2023-07-06 PROCEDURE — OTHER COUNSELING: OTHER

## 2023-07-06 PROCEDURE — 17003 DESTRUCT PREMALG LES 2-14: CPT

## 2023-07-06 PROCEDURE — OTHER LIQUID NITROGEN: OTHER

## 2023-07-06 RX ORDER — FLUOROURACIL 5 MG/G
CREAM TOPICAL BID
Qty: 40 | Refills: 1 | Status: ERX | COMMUNITY
Start: 2023-07-06

## 2023-07-06 ASSESSMENT — LOCATION DETAILED DESCRIPTION DERM
LOCATION DETAILED: LEFT MEDIAL FOREHEAD
LOCATION DETAILED: RIGHT SUPERIOR FOREHEAD
LOCATION DETAILED: LEFT FOREHEAD
LOCATION DETAILED: POSTERIOR MID-PARIETAL SCALP
LOCATION DETAILED: RIGHT FOREHEAD
LOCATION DETAILED: RIGHT INFERIOR TEMPLE
LOCATION DETAILED: RIGHT LATERAL FOREHEAD
LOCATION DETAILED: SUPERIOR MID FOREHEAD

## 2023-07-06 ASSESSMENT — LOCATION SIMPLE DESCRIPTION DERM
LOCATION SIMPLE: LEFT FOREHEAD
LOCATION SIMPLE: POSTERIOR SCALP
LOCATION SIMPLE: RIGHT FOREHEAD
LOCATION SIMPLE: SUPERIOR FOREHEAD
LOCATION SIMPLE: RIGHT TEMPLE

## 2023-07-06 ASSESSMENT — LOCATION ZONE DERM
LOCATION ZONE: SCALP
LOCATION ZONE: FACE

## 2023-07-06 NOTE — PROCEDURE: COUNSELING
Patient Specific Counseling (Will Not Stick From Patient To Patient): - Discussed using fluorouracil bid for 30 days on superior scalp. Discussed expectations in detail\\n- Patient opted to treat frontal scalp and forehead with LN2 and will use fluorouracil for the posterior scalp.
Detail Level: Simple
Detail Level: Zone

## 2023-07-06 NOTE — PROCEDURE: LIQUID NITROGEN
Application Tool (Optional): Liquid Nitrogen Sprayer
Detail Level: Detailed
Render Post-Care Instructions In Note?: yes
Consent: - Discussed that these are a result of cumulative sun exposure.\\n- Consent was obtained and risks were reviewed prior to procedure today. All questions were answered prior to procedure today.\\n- Risks discussed include but are not limited to pain, crusting, scabbing, blistering, scarring, temporary or permanent darker or lighter pigmentary change, recurrence, incomplete resolution, and infection.
Duration Of Freeze Thaw-Cycle (Seconds): 0
Show Aperture Variable?: No
Post-Care Instructions: - Patient was instructed to avoid picking at any of the treated lesions.

## 2023-10-21 ENCOUNTER — HEALTH MAINTENANCE LETTER (OUTPATIENT)
Age: 73
End: 2023-10-21

## 2024-03-09 ENCOUNTER — HEALTH MAINTENANCE LETTER (OUTPATIENT)
Age: 74
End: 2024-03-09

## 2025-01-20 ENCOUNTER — APPOINTMENT (OUTPATIENT)
Dept: URBAN - METROPOLITAN AREA CLINIC 252 | Age: 75
Setting detail: DERMATOLOGY
End: 2025-01-20

## 2025-01-20 VITALS — WEIGHT: 254 LBS

## 2025-01-20 DIAGNOSIS — L81.4 OTHER MELANIN HYPERPIGMENTATION: ICD-10-CM

## 2025-01-20 DIAGNOSIS — L57.0 ACTINIC KERATOSIS: ICD-10-CM

## 2025-01-20 DIAGNOSIS — L82.1 OTHER SEBORRHEIC KERATOSIS: ICD-10-CM

## 2025-01-20 PROCEDURE — OTHER COUNSELING: OTHER

## 2025-01-20 PROCEDURE — 17003 DESTRUCT PREMALG LES 2-14: CPT

## 2025-01-20 PROCEDURE — 99213 OFFICE O/P EST LOW 20 MIN: CPT | Mod: 25

## 2025-01-20 PROCEDURE — OTHER LIQUID NITROGEN: OTHER

## 2025-01-20 PROCEDURE — 17000 DESTRUCT PREMALG LESION: CPT

## 2025-01-20 ASSESSMENT — LOCATION DETAILED DESCRIPTION DERM
LOCATION DETAILED: RIGHT SUPERIOR MEDIAL FOREHEAD
LOCATION DETAILED: RIGHT LATERAL FOREHEAD
LOCATION DETAILED: RIGHT CENTRAL PARIETAL SCALP
LOCATION DETAILED: MID-FRONTAL SCALP
LOCATION DETAILED: LEFT SUPERIOR PARIETAL SCALP
LOCATION DETAILED: LEFT CENTRAL PARIETAL SCALP
LOCATION DETAILED: RIGHT SUPERIOR PARIETAL SCALP

## 2025-01-20 ASSESSMENT — LOCATION SIMPLE DESCRIPTION DERM
LOCATION SIMPLE: ANTERIOR SCALP
LOCATION SIMPLE: SCALP
LOCATION SIMPLE: RIGHT FOREHEAD

## 2025-01-20 ASSESSMENT — LOCATION ZONE DERM
LOCATION ZONE: FACE
LOCATION ZONE: SCALP

## 2025-01-20 NOTE — PROCEDURE: LIQUID NITROGEN
Render Note In Bullet Format When Appropriate: Yes
Consent: - Discussed that these are a result of cumulative sun exposure.\\n- Consent was obtained and risks were reviewed prior to procedure today. All questions were answered prior to procedure today.\\n- Risks discussed include but are not limited to pain, crusting, scabbing, blistering, scarring, temporary or permanent darker or lighter pigmentary change, recurrence, incomplete resolution, and infection.
Detail Level: Detailed
Duration Of Freeze Thaw-Cycle (Seconds): 0
Application Tool (Optional): Liquid Nitrogen Sprayer
Show Aperture Variable?: No
Post-Care Instructions: - Patient was instructed to avoid picking at any of the treated lesions.

## 2025-01-20 NOTE — HPI: SKIN LESIONS
Is This A New Presentation, Or A Follow-Up?: Growths
Additional History: Used fluorouracil bid for 30 days and no reaction.
None known

## 2025-03-16 ENCOUNTER — HEALTH MAINTENANCE LETTER (OUTPATIENT)
Age: 75
End: 2025-03-16

## (undated) DEVICE — SOLUTION WOUND CLEANSING 3/4OZ 10% PVP EA-L3011FB-50

## (undated) DEVICE — GLOVE PROTEXIS W/NEU-THERA 8.0  2D73TE80

## (undated) DEVICE — SYR 30ML LL W/O NDL 302832

## (undated) DEVICE — WRAP EZY KNEE

## (undated) DEVICE — DRSG STERI STRIP 1/2X4" R1547

## (undated) DEVICE — BLADE SAW SAGITTAL STRK 18X90X1.27MM HD SYS 6 6118-127-090

## (undated) DEVICE — GLOVE PROTEXIS POWDER FREE 7.5 ORTHOPEDIC 2D73ET75

## (undated) DEVICE — SU PDO 1 STRATAFIX 36X36CM CTX TAPERPOINT SXPD2B405

## (undated) DEVICE — BLADE CLIPPER 4412A

## (undated) DEVICE — CAST PADDING 6" STERILE 9046S

## (undated) DEVICE — GLOVE PROTEXIS MICRO 8.0  2D73PM80

## (undated) DEVICE — DRSG ADAPTIC 3X8" 6113

## (undated) DEVICE — DRAPE ARTHROSCOPY 120X92" 9414

## (undated) DEVICE — GLOVE PROTEXIS BLUE W/NEU-THERA 7.5  2D73EB75

## (undated) DEVICE — BONE CLEANING TIP INTERPULSE  0210-010-000

## (undated) DEVICE — HOOD FLYTE W/PEELAWAY 408-800-100

## (undated) DEVICE — ESU GROUND PAD UNIVERSAL W/O CORD

## (undated) DEVICE — PREP CHLORAPREP 26ML TINTED ORANGE  260815

## (undated) DEVICE — SU STRATAFIX PDS PLUS 0 CT 45CM SXPP1A406

## (undated) DEVICE — PACK TOTAL KNEE SOP15TKFSD

## (undated) DEVICE — MANIFOLD NEPTUNE 4 PORT 700-20

## (undated) DEVICE — BONE CEMENT MIXEVAC III HI VAC KIT  0206-015-000

## (undated) DEVICE — BLADE SAW SAGITTAL STRK 21X90X1.27MM HD SYS 6 6221-127-090

## (undated) DEVICE — SOL NACL 0.9% IRRIG 3000ML BAG 2B7477

## (undated) DEVICE — NDL SPINAL 18GA 3.5" 405184

## (undated) DEVICE — DRAPE STERI U 1015

## (undated) DEVICE — SUCTION IRR SYSTEM W/O TIP INTERPULSE HANDPIECE 0210-100-000

## (undated) DEVICE — CAST PADDING 4" UNSTERILE 9044

## (undated) DEVICE — SUTURE STRATAFIX SPIRAL 3-0 SXMD2B412

## (undated) DEVICE — SOL WATER IRRIG 1000ML BOTTLE 2F7114

## (undated) DEVICE — GLOVE PROTEXIS POWDER FREE 8.5 ORTHOPEDIC 2D73ET85

## (undated) DEVICE — DRAPE IOBAN INCISE 23X17" 6650EZ

## (undated) DEVICE — CAST PADDING 6" UNSTERILE 9046

## (undated) DEVICE — SU STRATAFIX MONOCRYL 2-0 SPIRAL SH 20CM SXMP1B409

## (undated) DEVICE — LINEN TOWEL PACK X5 5464

## (undated) RX ORDER — PROPOFOL 10 MG/ML
INJECTION, EMULSION INTRAVENOUS
Status: DISPENSED
Start: 2022-01-04

## (undated) RX ORDER — GLYCOPYRROLATE 0.2 MG/ML
INJECTION, SOLUTION INTRAMUSCULAR; INTRAVENOUS
Status: DISPENSED
Start: 2022-01-04

## (undated) RX ORDER — ONDANSETRON 2 MG/ML
INJECTION INTRAMUSCULAR; INTRAVENOUS
Status: DISPENSED
Start: 2022-01-04

## (undated) RX ORDER — ACETAMINOPHEN 325 MG/1
TABLET ORAL
Status: DISPENSED
Start: 2022-01-04

## (undated) RX ORDER — HYDROMORPHONE HCL IN WATER/PF 6 MG/30 ML
PATIENT CONTROLLED ANALGESIA SYRINGE INTRAVENOUS
Status: DISPENSED
Start: 2022-01-04

## (undated) RX ORDER — TRANEXAMIC ACID 650 MG/1
TABLET ORAL
Status: DISPENSED
Start: 2022-01-04

## (undated) RX ORDER — CEFAZOLIN SODIUM IN 0.9 % NACL 3 G/100 ML
INTRAVENOUS SOLUTION, PIGGYBACK (ML) INTRAVENOUS
Status: DISPENSED
Start: 2022-01-04

## (undated) RX ORDER — CELECOXIB 200 MG/1
CAPSULE ORAL
Status: DISPENSED
Start: 2022-01-04